# Patient Record
Sex: MALE | Race: WHITE | NOT HISPANIC OR LATINO | Employment: UNEMPLOYED | URBAN - METROPOLITAN AREA
[De-identification: names, ages, dates, MRNs, and addresses within clinical notes are randomized per-mention and may not be internally consistent; named-entity substitution may affect disease eponyms.]

---

## 2019-12-06 ENCOUNTER — APPOINTMENT (EMERGENCY)
Dept: RADIOLOGY | Facility: HOSPITAL | Age: 34
End: 2019-12-06
Payer: COMMERCIAL

## 2019-12-06 ENCOUNTER — HOSPITAL ENCOUNTER (EMERGENCY)
Facility: HOSPITAL | Age: 34
Discharge: HOME | End: 2019-12-06
Attending: EMERGENCY MEDICINE
Payer: COMMERCIAL

## 2019-12-06 ENCOUNTER — TELEPHONE (OUTPATIENT)
Dept: UROLOGY | Facility: MEDICAL CENTER | Age: 34
End: 2019-12-06

## 2019-12-06 VITALS
HEART RATE: 80 BPM | TEMPERATURE: 96.2 F | DIASTOLIC BLOOD PRESSURE: 83 MMHG | SYSTOLIC BLOOD PRESSURE: 144 MMHG | OXYGEN SATURATION: 98 % | BODY MASS INDEX: 34.53 KG/M2 | WEIGHT: 220 LBS | HEIGHT: 67 IN | RESPIRATION RATE: 18 BRPM

## 2019-12-06 DIAGNOSIS — R31.9 HEMATURIA: ICD-10-CM

## 2019-12-06 DIAGNOSIS — N23 RENAL COLIC ON LEFT SIDE: Primary | ICD-10-CM

## 2019-12-06 DIAGNOSIS — N20.0 NEPHROLITHIASIS: ICD-10-CM

## 2019-12-06 DIAGNOSIS — R93.819 ABNORMAL FINDING ON DIAGNOSTIC IMAGING OF TESTICLE: ICD-10-CM

## 2019-12-06 LAB
BACTERIA UR QL AUTO: ABNORMAL /HPF
BILIRUB UR QL STRIP: NEGATIVE
CLARITY UR: CLEAR
COLOR UR: ABNORMAL
COLOR, POC: NORMAL
GLUCOSE UR STRIP-MCNC: NEGATIVE MG/DL
HGB UR QL STRIP.AUTO: ABNORMAL
HYALINE CASTS #/AREA URNS LPF: ABNORMAL /LPF
KETONES UR STRIP-MCNC: NEGATIVE MG/DL
LEUKOCYTE ESTERASE UR QL STRIP: NEGATIVE
NITRITE UR QL STRIP: NEGATIVE
NON-SQ EPI CELLS URNS QL MICRO: ABNORMAL /HPF
PH UR STRIP.AUTO: 7 [PH] (ref 4.5–8)
PROT UR STRIP-MCNC: ABNORMAL MG/DL
RBC #/AREA URNS AUTO: ABNORMAL /HPF
SP GR UR STRIP.AUTO: 1.01 (ref 1–1.03)
UROBILINOGEN UR QL STRIP.AUTO: 0.2 E.U./DL
WBC #/AREA URNS AUTO: ABNORMAL /HPF

## 2019-12-06 PROCEDURE — 87591 N.GONORRHOEAE DNA AMP PROB: CPT | Performed by: PHYSICIAN ASSISTANT

## 2019-12-06 PROCEDURE — 99285 EMERGENCY DEPT VISIT HI MDM: CPT | Performed by: PHYSICIAN ASSISTANT

## 2019-12-06 PROCEDURE — 87491 CHLMYD TRACH DNA AMP PROBE: CPT | Performed by: PHYSICIAN ASSISTANT

## 2019-12-06 PROCEDURE — 74176 CT ABD & PELVIS W/O CONTRAST: CPT

## 2019-12-06 PROCEDURE — 76870 US EXAM SCROTUM: CPT

## 2019-12-06 PROCEDURE — 81001 URINALYSIS AUTO W/SCOPE: CPT

## 2019-12-06 PROCEDURE — 99284 EMERGENCY DEPT VISIT MOD MDM: CPT

## 2019-12-06 RX ORDER — OXYCODONE HYDROCHLORIDE AND ACETAMINOPHEN 5; 325 MG/1; MG/1
1 TABLET ORAL EVERY 4 HOURS PRN
Qty: 10 TABLET | Refills: 0 | Status: SHIPPED | OUTPATIENT
Start: 2019-12-06 | End: 2019-12-09

## 2019-12-06 RX ORDER — ONDANSETRON 4 MG/1
4 TABLET, ORALLY DISINTEGRATING ORAL EVERY 8 HOURS PRN
Qty: 20 TABLET | Refills: 0 | Status: SHIPPED | OUTPATIENT
Start: 2019-12-06 | End: 2020-02-06

## 2019-12-06 RX ORDER — TAMSULOSIN HYDROCHLORIDE 0.4 MG/1
0.4 CAPSULE ORAL DAILY
Qty: 10 CAPSULE | Refills: 0 | Status: SHIPPED | OUTPATIENT
Start: 2019-12-06 | End: 2020-02-06

## 2019-12-06 RX ORDER — NAPROXEN 500 MG/1
500 TABLET ORAL EVERY 12 HOURS PRN
Qty: 10 TABLET | Refills: 0 | Status: SHIPPED | OUTPATIENT
Start: 2019-12-06 | End: 2020-02-06

## 2019-12-06 NOTE — TELEPHONE ENCOUNTER
This is a new patient and was seen in [facility] for kidney stone   Please call patient at [phone]  Patient is calling his insurance to make sure he is in par with us

## 2019-12-06 NOTE — TELEPHONE ENCOUNTER
Spoke with patient  Patient scheduled for 12/10/19 at 2:45 in the [facility] office with Ryan Michelle  Office address and location provided to patient

## 2019-12-06 NOTE — DISCHARGE INSTRUCTIONS
Kidney Stones   WHAT YOU NEED TO KNOW:   Kidney stones form in the urinary system when the water and waste in your urine are out of balance  When this happens, certain types of waste crystals separate from the urine  The crystals build up and form kidney stones  You may have 1 or more kidney stones  DISCHARGE INSTRUCTIONS:   Return to the emergency department if:   · You have vomiting that is not relieved by medicine  Contact your healthcare provider if:   · You have a fever  · You have trouble passing urine  · You see blood in your urine  · You have severe pain  · You have any questions or concerns about your condition or care  Medicines:   · NSAIDs , such as ibuprofen, help decrease swelling, pain, and fever  This medicine is available with or without a doctor's order  NSAIDs can cause stomach bleeding or kidney problems in certain people  If you take blood thinner medicine, always ask your healthcare provider if NSAIDs are safe for you  Always read the medicine label and follow directions  · Prescription medicine  may be given  Ask how to take this medicine safely  · Medicines  to balance your electrolytes may be needed  · Take your medicine as directed  Contact your healthcare provider if you think your medicine is not helping or if you have side effects  Tell him or her if you are allergic to any medicine  Keep a list of the medicines, vitamins, and herbs you take  Include the amounts, and when and why you take them  Bring the list or the pill bottles to follow-up visits  Carry your medicine list with you in case of an emergency  Follow up with your healthcare provider as directed: You may need to return for more tests  Write down your questions so you remember to ask them during your visits  Self-care:   · Drink plenty of liquids  Your healthcare provider may tell you to drink at least 8 to 12 (eight-ounce) cups of liquids each day   This helps flush out the kidney stones when you urinate  Water is the best liquid to drink  · Strain your urine every time you go to the bathroom  Urinate through a strainer or a piece of thin cloth to catch the stones  Take the stones to your healthcare provider so they can be sent to the lab for tests  This will help your healthcare providers plan the best treatment for you  · Eat a variety of healthy foods  Healthy foods include fruits, vegetables, whole-grain breads, low-fat dairy products, beans, and fish  You may need to limit how much sodium (salt) or protein you eat  Ask for information about the best foods for you  · Stay active  Your stones may pass more easily by if you stay active  Ask about the best activities for you  After you pass your kidney stones:  Once you have passed your kidney stones, your healthcare provider may  order a 24-hour urine test  Results from a 24-hour urine test will help your healthcare provider plan ways to prevent more stones from forming  If you are told to do a 24-hour test, your healthcare provider will give you more instructions  © 2017 [address] [facility] Information is for End User's use only and may not be sold, redistributed or otherwise used for commercial purposes  All illustrations and images included in CareNotes® are the copyrighted property of A D A M [employer]  or Mike Lim  The above information is an  only  It is not intended as medical advice for individual conditions or treatments  Talk to your doctor, nurse or pharmacist before following any medical regimen to see if it is safe and effective for you  How to Strain Your Urine   WHAT YOU NEED TO KNOW:   Urinate into a strainer (funnel with a fine mesh on the bottom) or glass jar to collect kidney stones  DISCHARGE INSTRUCTIONS:   Medicines:   · Pain medicine: You may be given medicine to take away or decrease pain   Do not wait until the pain is severe before you take your medicine  · NSAIDs:  These medicines decrease swelling, pain, and fever  NSAIDs are available without a doctor's order  Ask which medicine is right for you  Ask how much to take and when to take it  Take as directed  NSAIDs can cause stomach bleeding and kidney problems if not taken correctly  · Nausea medicine: This medicine calms your stomach and prevents or controls vomiting  · Take your medicine as directed  Contact your healthcare provider if you think your medicine is not helping or if you have side effects  Tell him of her if you are allergic to any medicine  Keep a list of the medicines, vitamins, and herbs you take  Include the amounts, and when and why you take them  Bring the list or the pill bottles to follow-up visits  Carry your medicine list with you in case of an emergency  Drink liquids as directed:  Drink about 3 liters of liquids each day, or as directed  That equals about 12 glasses of water or fruit juice  Half of your total daily liquids should be water  Limit coffee, tea, and soda to 2 cups daily  Your urine will be pale and clear if you are drinking enough liquid  Self-care:   · Activity:  Exercise, such as walking, may help decrease your pain  · Avoid heat:  Heat may cause you to sweat, urinate less, and become dehydrated  Follow up with your healthcare provider or urologist as directed:  Write down your questions so you remember to ask them during your visits  Contact your healthcare provider or urologist if:   · You have a fever and chills  · Your urine looks cloudy or has a bad smell  · You have burning pain when you urinate  · You have trouble urinating  · You are vomiting and it does not get better, even after you take medicine  · You have questions or concerns about your condition or care  Return to the emergency department if:   · You are not able to urinate  · You have severe pain in your lower abdomen or side      · Your heart flutters or beats faster than usual   © 2017 [address] [facility] Information is for End User's use only and may not be sold, redistributed or otherwise used for commercial purposes  All illustrations and images included in CareNotes® are the copyrighted property of A D A M , Inc  or Mike Lim  The above information is an  only  It is not intended as medical advice for individual conditions or treatments  Talk to your doctor, nurse or pharmacist before following any medical regimen to see if it is safe and effective for you

## 2019-12-06 NOTE — TELEPHONE ENCOUNTER
Complaint/diagnosis:  Large kidney stone    Insurance  [facility] plus    History of Cancer no    Previous Urologist  No     Outside testing/where  no    what kind of test  No     Records requested/where  epic    Preferred Location [facility]

## 2019-12-06 NOTE — ED PROVIDER NOTES
History  Chief Complaint   Patient presents with    Blood in Urine     Pt c/o blood in urine this am   Pt states has had a kidney stone in past but does not feel the same     Ardith Done is a 29 y o  Male with a PMHx of Nephrolithiasis who presents to the ED with complaints of hematuria and left groin pain over the past 3 hours  Patient states yesterday he was at work when he accidentally sat on his left testicle  Patient states he has been having left testicular pressure  Patient reports mild nausea  Patient reports he did have a self diagnosis kidney stone approximately 2 years ago which he passed on his own  Denies recent unprotected sex  Denies vomiting, testicular swelling, penile discharge, penile pain, dysuria, urinary frequency, urinary urgency, chest pain, shortness of breath, fever, chills  History provided by:  Patient  Blood in Urine   This is a new problem  The current episode started today  He describes the hematuria as gross hematuria  The hematuria occurs throughout his entire urinary stream  He reports no clotting in his urine stream  Irritative symptoms do not include frequency or urgency  Associated symptoms include nausea  Pertinent negatives include no abdominal pain, chills, dysuria, fever, flank pain or vomiting  His past medical history is significant for kidney stones  None       Past Medical History:   Diagnosis Date    Kidney stone        Past Surgical History:   Procedure Laterality Date    CYST REMOVAL      foot       History reviewed  No pertinent family history  I have reviewed and agree with the history as documented  Social History     Tobacco Use    Smoking status: Current Some Day Smoker     Types: Cigars    Smokeless tobacco: Never Used   Substance Use Topics    Alcohol use: Not Currently     Frequency: Never    Drug use: Never        Review of Systems   Constitutional: Negative for appetite change, chills, fever and unexpected weight change     HENT: Negative for congestion, drooling, ear pain, rhinorrhea, sore throat, trouble swallowing and voice change  Eyes: Negative for pain, discharge, redness and visual disturbance  Respiratory: Negative for cough, shortness of breath, wheezing and stridor  Cardiovascular: Negative for chest pain, palpitations and leg swelling  Gastrointestinal: Positive for nausea  Negative for abdominal pain, blood in stool, constipation, diarrhea and vomiting  Genitourinary: Positive for hematuria and testicular pain  Negative for decreased urine volume, difficulty urinating, discharge, dysuria, enuresis, flank pain, frequency, genital sores, penile pain, penile swelling, scrotal swelling and urgency  Musculoskeletal: Negative for gait problem, joint swelling, neck pain and neck stiffness  Skin: Negative for color change and rash  Neurological: Negative for dizziness, seizures, light-headedness and headaches  Physical Exam  Physical Exam   Constitutional: He is oriented to person, place, and time  He appears well-developed and well-nourished  HENT:   Head: Normocephalic and atraumatic  Nose: Nose normal    Mouth/Throat: Oropharynx is clear and moist    Eyes: Pupils are equal, round, and reactive to light  Conjunctivae and EOM are normal    Neck: Normal range of motion  Neck supple  Cardiovascular: Normal rate, regular rhythm and intact distal pulses  Pulmonary/Chest: Effort normal and breath sounds normal    Abdominal: Normal appearance and bowel sounds are normal  There is no tenderness  Genitourinary: Cremasteric reflex is present  Left testis shows tenderness  Circumcised  Genitourinary Comments: RN present for  examination  TTP of the left posterior testicle  No appreciable swelling or erythema  Musculoskeletal: Normal range of motion  Neurological: He is alert and oriented to person, place, and time  Skin: Skin is warm and dry  Capillary refill takes less than 2 seconds     Nursing note and vitals reviewed  Vital Signs  ED Triage Vitals [12/06/19 0933]   Temperature Pulse Respirations Blood Pressure SpO2   (!) 96 2 °F (35 7 °C) 80 18 144/83 98 %      Temp Source Heart Rate Source Patient Position - Orthostatic VS BP Location FiO2 (%)   Tympanic Monitor Sitting Left arm --      Pain Score       2           Vitals:    12/06/19 0933   BP: 144/83   Pulse: 80   Patient Position - Orthostatic VS: Sitting         Visual Acuity      ED Medications  Medications - No data to display    Diagnostic Studies  Results Reviewed     Procedure Component Value Units Date/Time    Urine Microscopic [[id number]]  (Abnormal) Collected:  12/06/19 1111    Lab Status:  Final result Specimen:  Urine, Other Updated:  12/06/19 1137     RBC, UA Innumerable /hpf      WBC, UA None Seen /hpf      Epithelial Cells None Seen /hpf      Bacteria, UA None Seen /hpf      Hyaline Casts, UA None Seen /lpf     Chlamydia/GC amplified DNA by PCR [[id number]] Collected:  12/06/19 1117    Lab Status: In process Specimen:  Urine, Other Updated:  12/06/19 1120    POCT urinalysis dipstick [[id number]]  (Normal) Resulted:  12/06/19 1116    Lab Status:  Final result Specimen:  Urine Updated:  12/06/19 1116     Color, UA BLOODY    Urine Macroscopic, POC [[id number]]  (Abnormal) Collected:  12/06/19 1111    Lab Status:  Final result Specimen:  Urine Updated:  12/06/19 1111     Color, UA Bloody     Clarity, UA Clear     pH, UA 7 0     Leukocytes, UA Negative     Nitrite, UA Negative     Protein, UA Trace mg/dl      Glucose, UA Negative mg/dl      Ketones, UA Negative mg/dl      Urobilinogen, UA 0 2 E U /dl      Bilirubin, UA Negative     Blood, UA Large     Specific [place], UA 1 015    Narrative:       CLINITEK RESULT                 CT renal stone study abdomen pelvis without contrast   Final Result by Rao Cruz MD (12/06 1127)      1   Mild left-sided hydronephrosis, secondary to a 4 mm stone in the region of the left UPJ    2  Slight distention of the more distal left ureter with periureteral edema but without evidence of a distal ureteral stone  Appearance suggests recent passage of a calculus  Intraprostatic calcification noted, likely glandular or less likely within    the prostatic urethra   3  Additional small punctate bilateral intrarenal calculi   4  Hepatic steatosis         Workstation performed: [id number]         US scrotum and testicles   Final Result by Natalee King, DO (12/06 1055)       Solitary echogenic focus in the right testicle likely calcification measuring 3 mm  This may be the sequela of remote prior infection or trauma  Otherwise unremarkable scrotal ultrasound  Workstation performed: [id number]                    Procedures  Procedures         ED Course  ED Course as of Dec 06 1250   Fri Dec 06, 2019   1140 Educated patient regarding diagnosis and management  Advised patient to follow up with PCP  Advised patient to RTER for persistent or worsening symptoms  MDM  Number of Diagnoses or Management Options  Abnormal finding on diagnostic imaging of testicle: new and does not require workup  Hematuria: new and does not require workup  Nephrolithiasis: new and does not require workup  Renal colic on left side: new and does not require workup  Diagnosis management comments: Drew Rodrigues is a 29 y o  Male with a PMHx of Nephrolithiasis who presents to the ED with complaints of hematuria and left groin pain over the past 3 hours  Patient states yesterday he was at work when he accidentally sat on his left testicle  Patient states he has been having left testicular pressure  Patient reports mild nausea  Patient reports he did have a self diagnosis kidney stone approximately 2 years ago which he passed on his own  Denies recent unprotected sex    Denies vomiting, testicular swelling, penile discharge, penile pain, dysuria, urinary frequency, urinary urgency, chest pain, shortness of breath, fever, chills  UA with large blood  US of scrotum and testicles significant for solitary echogenic focus in the right testicle likely calcification measuring 3 mm  This may be the sequela of remote prior infection or trauma  CT Renal Stones study significant for 1  Mild left-sided hydronephrosis, secondary to a 4 mm stone in the region of the left UPJ  2  Slight distention of the more distal left ureter with periureteral edema but without evidence of a distal ureteral stone  Appearance suggests recent passage of a calculus  Intraprostatic calcification noted, likely glandular or less likely within the prostatic urethra  3  Additional small punctate bilateral intrarenal calculi  4  Hepatic steatosis  I had an extensive conversation with the patient in regards the the findings of the imaging study including incidental findings as portrayed to me by the radiologist's report  Pain is under control and patient is making urine without difficulty  Patient was advised to strain urine  Patient was advised to follow up with outpatient urology  I provided patient with strict RTER precautions  I advised patient follow-up with PCP in 24-48 hours  Patient verbalized understanding          Amount and/or Complexity of Data Reviewed  Clinical lab tests: reviewed and ordered  Tests in the radiology section of CPT®: ordered and reviewed  Review and summarize past medical records: yes    Risk of Complications, Morbidity, and/or Mortality  Presenting problems: moderate  Diagnostic procedures: moderate  Management options: moderate    Patient Progress  Patient progress: improved        Disposition  Final diagnoses:   Renal colic on left side   Nephrolithiasis   Abnormal finding on diagnostic imaging of testicle - Right Testicle Calcification   Hematuria     Time reflects when diagnosis was documented in both MDM as applicable and the Disposition within this note     Time User Action Codes Description Comment 12/6/2019 11:42 AM Radha Fields Add [C97] Renal colic on left side     12/6/2019 11:43 AM Radha Fields Add [N20 0] Nephrolithiasis     12/6/2019 11:43 AM Radha Fields Add [N44 2] Testicular cyst     12/6/2019 11:43 AM Radha Fiedls Remove [N44 2] Testicular cyst     12/6/2019 11:44 AM Radha Fields Add [R93 819] Abnormal finding on diagnostic imaging of testicle     12/6/2019 11:44 AM Radha Fields Modify [R93 819] Abnormal finding on diagnostic imaging of testicle Right Testicle Calcification    12/6/2019 11:46 AM Radha Fields Add [R31 9] Hematuria       ED Disposition     ED Disposition Condition Date/Time Comment    Discharge Stable Fri Dec 6, 2019 11:45 AM Usman Zarate discharge to home/self care              Follow-up Information     Follow up With Specialties Details Why Contact Info Additional [address] [facility] Emergency Medicine Go to  If symptoms worsen [address]  [phone]  ED, [address] [address]    Wally Whitney MD Internal Medicine, Geriatric Medicine Schedule an appointment as soon as possible for a visit   [address] [facility] [address]   [address]        [facility] Schedule an appointment as soon as possible for a visit   [address] [facility] [address] [facility] [id number]  [address] [place] [address] For U[facility], [address] [place] [address] [address], [place], [place], [address] [facility] [address]          Discharge Medication List as of 12/6/2019 11:46 AM      START taking these medications    Details   naproxen (NAPROSYN) 500 mg tablet Take 1 tablet (500 mg total) by mouth every 12 (twelve) hours as needed for mild pain for up to 5 days, Starting Fri 12/6/2019, Until Wed 12/11/2019, Print      ondansetron (ZOFRAN-ODT) 4 mg disintegrating tablet Take 1 tablet (4 mg total) by mouth every 8 (eight) hours as needed for nausea or vomiting, Starting Fri 12/6/2019, Print      oxyCODONE-acetaminophen (PERCOCET) 5-325 mg per tablet Take 1 tablet by mouth every 4 (four) hours as needed for moderate pain or severe pain for up to 3 daysMax Daily Amount: 6 tablets, Starting Fri 12/6/2019, Until Mon 12/9/2019, Print      tamsulosin (FLOMAX) 0 4 mg Take 1 capsule (0 4 mg total) by mouth daily Take once daily until stone passes, Starting Fri 12/6/2019, Print           No discharge procedures on file      ED Provider  Electronically Signed by           Jayda Jon PA-C  12/06/19 1217

## 2019-12-07 LAB
C TRACH DNA SPEC QL NAA+PROBE: NEGATIVE
N GONORRHOEA DNA SPEC QL NAA+PROBE: NEGATIVE

## 2019-12-23 ENCOUNTER — OFFICE VISIT (OUTPATIENT)
Dept: UROLOGY | Facility: CLINIC | Age: 34
End: 2019-12-23
Payer: COMMERCIAL

## 2019-12-23 VITALS
SYSTOLIC BLOOD PRESSURE: 124 MMHG | WEIGHT: 214 LBS | HEART RATE: 76 BPM | DIASTOLIC BLOOD PRESSURE: 68 MMHG | BODY MASS INDEX: 33.59 KG/M2 | HEIGHT: 67 IN

## 2019-12-23 DIAGNOSIS — Z30.09 STERILIZATION CONSULT: ICD-10-CM

## 2019-12-23 DIAGNOSIS — N20.0 CALCULUS OF KIDNEY: Primary | ICD-10-CM

## 2019-12-23 DIAGNOSIS — N50.89 TESTICULAR CALCIFICATION: ICD-10-CM

## 2019-12-23 PROCEDURE — 99202 OFFICE O/P NEW SF 15 MIN: CPT | Performed by: UROLOGY

## 2019-12-23 NOTE — PROGRESS NOTES
Progress Note - Urology  Nela Mesa 29 y o  male MRN: [id number]  Encounter: [id number]      Chief Complaint:   Chief Complaint   Patient presents with    Nephrolithiasis       HPI:   49-year-old male presents for follow-up evaluation of his 2nd renal stone which passed spontaneously  He had a stone approximately 4 years ago which passed spontaneously he recently had discomfort in the left flank and present to the emergency room with CT scan did reveal a 4 mm stone in the proximal UPJ junction  The stone passed rather promptly  He has no further pain no hematuria  It did present with hematuria  In addition he had an episode of testicular discomfort for which he had an ultrasound done  He has no fever chills no history of UTI  There is no history of scrotal trauma or scrotal surgery  The ultrasound apparently revealed a calcification within the body of the right testis  The CT scan showed approximately 1 to 2 small stones in the right kidney less than 1 mm    In addition he has 2 children  He wishes to discuss elective sterilization  We did review vasectomy at length  We reviewed the procedure and performing the procedure in the office discuss this situation  We also discussed potential complications these include but not limited to infection, scrotal hematoma, ecchymosis  Chronic testicular pain  Increased risk of epididymitis  Failure to achieve sterilization  We discussed the fact that after the surgical intervention he would not have any change in sexual function or appearance  Advised that he is not sterile immediately and would need to have 2- semen analysis over the course of the 3-4 month period time post vasectomy        MEDS:    Current Outpatient Medications:     hydrocortisone 2 5 % cream, , Disp: , Rfl:     ondansetron (ZOFRAN-ODT) 4 mg disintegrating tablet, Take 1 tablet (4 mg total) by mouth every 8 (eight) hours as needed for nausea or vomiting, Disp: 20 tablet, Rfl: 0   tamsulosin (FLOMAX) 0 4 mg, Take 1 capsule (0 4 mg total) by mouth daily Take once daily until stone passes, Disp: 10 capsule, Rfl: 0    naproxen (NAPROSYN) 500 mg tablet, Take 1 tablet (500 mg total) by mouth every 12 (twelve) hours as needed for mild pain for up to 5 days, Disp: 10 tablet, Rfl: 0      PMH:  Past Medical History:   Diagnosis Date    Kidney stone          PSH  Past Surgical History:   Procedure Laterality Date    CYST REMOVAL      foot    EXCISION / CURETTAGE BONE CYST TALUS / CALCANEUS Left          FH  Family History   Problem Relation Age of Onset    Nephrolithiasis Father     Nephrolithiasis Mother         SH  Social History     Socioeconomic History    Marital status: /Civil Union     Spouse name: None    Number of children: None    Years of education: None    Highest education level: None   Occupational History    None   Social Needs    Financial resource strain: None    Food insecurity:     Worry: None     Inability: None    Transportation needs:     Medical: None     Non-medical: None   Tobacco Use    Smoking status: Never Smoker    Smokeless tobacco: Never Used   Substance and Sexual Activity    Alcohol use: Yes     Frequency: Never    Drug use: Never    Sexual activity: None   Lifestyle    Physical activity:     Days per week: None     Minutes per session: None    Stress: None   Relationships    Social connections:     Talks on phone: None     Gets together: None     Attends Cheondoism service: None     Active member of club or organization: None     Attends meetings of clubs or organizations: None     Relationship status: None    Intimate partner violence:     Fear of current or ex partner: None     Emotionally abused: None     Physically abused: None     Forced sexual activity: None   Other Topics Concern    None   Social History Narrative    None          ROS:  Review of Systems   Constitutional: Negative  Respiratory: Negative      Cardiovascular: Negative  Neurological: Negative  Psychiatric/Behavioral: Negative  Vitals:  Blood pressure 124/68, pulse 76, height 5' 7" (1 702 m), weight 97 1 kg (214 lb)  Physical Exam:       General Appearance    51-year-old male in no acute distress  Moderately overweight  Cardiac   Heart rate is regular  Pulmonary   Breathing is not labored  Abdomen   Soft, no mass appreciated no tenderness noted  Back   No CVA tenderness  Genitalia   Normal penis  Meatus is normal no discharge scrotum is unremarkable both testes are palpated without incident  No scrotal masses noted, no  testicular tenderness or masses noted  The epididymis and vas deferens palpated bilaterally are unremarkable  Rectal     Deferred  Extremities    No cyanosis or edema  Neurologic   Grossly physiologic  Moving all extremities  Hearing intact cognitive function intact  No facial asymmetry    Normal gait    Lab, Imaging and other studies:  Recent Results (from the past 672 hour(s))   Urine Macroscopic, POC    Collection Time: 12/06/19 11:11 AM   Result Value Ref Range    Color, UA Bloody     Clarity, UA Clear     pH, UA 7 0 4 5 - 8 0    Leukocytes, UA Negative Negative    Nitrite, UA Negative Negative    Protein, UA Trace (A) Negative mg/dl    Glucose, UA Negative Negative mg/dl    Ketones, UA Negative Negative mg/dl    Urobilinogen, UA 0 2 0 2, 1 0 E U /dl E U /dl    Bilirubin, UA Negative Negative    Blood, UA Large (A) Negative    Specific Gravity, UA 1 015 1 003 - 1 030   Urine Microscopic    Collection Time: 12/06/19 11:11 AM   Result Value Ref Range    RBC, UA Innumerable (A) None Seen, 0-5 /hpf    WBC, UA None Seen None Seen, 0-5, 5-55, 5-65 /hpf    Epithelial Cells None Seen None Seen, Occasional /hpf    Bacteria, UA None Seen None Seen, Occasional /hpf    Hyaline Casts, UA None Seen None Seen /lpf   POCT urinalysis dipstick    Collection Time: 12/06/19 11:16 AM   Result Value Ref Range    Color, UA BLOODY    Chlamydia/GC amplified DNA by PCR    Collection Time: 12/06/19 11:17 AM   Result Value Ref Range    N gonorrhoeae, DNA Probe Negative Negative    Chlamydia trachomatis, DNA Probe Negative Negative           IMPRESSION:   1  Left renal calculus probable spontaneous passage   2  Discussion elective sterilization   3  History of solitary testicular calcification    PLAN:   I will check a urinalysis that to be certain there is no further bleeding  If positive then ultrasound will be indicated  We discussed elective sterilization he will take this under consideration is given literature regarding stone disease as well as sterilization  He will contact us if he wished to proceed in this direction  Suggested in of a scrotal ultrasound in 1 year  Self examination is important and he will call if there should be a mass or lump in the testis  Please note :  Voice dictation software has been used to create this document  There may be inadvertent transcription errors

## 2019-12-23 NOTE — LETTER
December 23, 2019     MD [address] , Brian Rubio  [address]    Patient: Isac Burciaga   YOB: 1985   Date of Visit: 12/23/2019       Dear Dr Darlene Dixon: Thank you for referring Josiah Tijerina to me for evaluation  Below are my notes for this consultation  If you have questions, please do not hesitate to call me  I look forward to following your patient along with you           Sincerely,        Claire Ballesteros MD        CC: No Recipients

## 2020-01-02 ENCOUNTER — TELEPHONE (OUTPATIENT)
Dept: UROLOGY | Facility: MEDICAL CENTER | Age: 35
End: 2020-01-02

## 2020-01-02 ENCOUNTER — APPOINTMENT (OUTPATIENT)
Dept: LAB | Facility: CLINIC | Age: 35
End: 2020-01-02
Payer: COMMERCIAL

## 2020-01-02 DIAGNOSIS — N20.0 CALCULUS OF KIDNEY: ICD-10-CM

## 2020-01-02 DIAGNOSIS — N20.1 LEFT URETERAL CALCULUS: Primary | ICD-10-CM

## 2020-01-02 LAB
BACTERIA UR QL AUTO: ABNORMAL /HPF
BILIRUB UR QL STRIP: NEGATIVE
CLARITY UR: CLEAR
COLOR UR: YELLOW
GLUCOSE UR STRIP-MCNC: NEGATIVE MG/DL
HGB UR QL STRIP.AUTO: ABNORMAL
HYALINE CASTS #/AREA URNS LPF: ABNORMAL /LPF
KETONES UR STRIP-MCNC: NEGATIVE MG/DL
LEUKOCYTE ESTERASE UR QL STRIP: NEGATIVE
NITRITE UR QL STRIP: NEGATIVE
NON-SQ EPI CELLS URNS QL MICRO: ABNORMAL /HPF
PH UR STRIP.AUTO: 6.5 [PH]
PROT UR STRIP-MCNC: NEGATIVE MG/DL
RBC #/AREA URNS AUTO: ABNORMAL /HPF
SP GR UR STRIP.AUTO: 1.02 (ref 1–1.03)
UROBILINOGEN UR QL STRIP.AUTO: 0.2 E.U./DL
WBC #/AREA URNS AUTO: ABNORMAL /HPF

## 2020-01-02 PROCEDURE — 81001 URINALYSIS AUTO W/SCOPE: CPT

## 2020-01-02 RX ORDER — HYDROCODONE BITARTRATE AND ACETAMINOPHEN 5; 325 MG/1; MG/1
1 TABLET ORAL EVERY 4 HOURS PRN
Qty: 10 TABLET | Refills: 0 | Status: SHIPPED | OUTPATIENT
Start: 2020-01-02 | End: 2020-01-04

## 2020-01-02 NOTE — TELEPHONE ENCOUNTER
Patient of [address] [place] [address] office  Patient seen on 12/23 by Dr Radha Sampson  Patient had spontaneously passed stone by that time  Patient denied any symptoms at time of visit  Patient ordered for UA, which he is going to do today  Per OV note, Dr Radha Sampson states if UA is positive, US would be indicated  Call placed to patient, he states that last night he woke up from sleep due to left sided pain (9 out of 10)  Patient took narcotics prescribed from ER visit and pain was relived  Patient would like to know if he should proceed with any further testing  Advised will route to provider and someone from office will call back

## 2020-01-02 NOTE — TELEPHONE ENCOUNTER
Patient calls has renewed left flank pain  Prior CT showed a proximal 4 millimeter calculus  He is currently pain level 4-5  It is still in the flank and not radiating to the testicle at this time  I advised him to go to the emergency room should there be intractable pain, nausea, vomiting, fever, or chills  He should strain the urine and push fluids as much as possible  A prescription sent for 10 tablets of Vicodin 5/325   Patient to call in 24 hours unless he goes to the emergency room

## 2020-01-02 NOTE — TELEPHONE ENCOUNTER
Patient of Dr Castillo Grimm  Patient believes he has another stone  Patient is having a lot of discomfort and pain  Patient is having pain when he urinates  Patient is going to get u/a now but would like to know what else he should do for pain  Patient states his pain level now is about 6  Please advise

## 2020-01-06 NOTE — TELEPHONE ENCOUNTER
Patient left message with answering service yesterday as follows:    Message # [id number]         2020 04:53p   [JI]  TO:  [facility]  CALLER:  Dot Pattersonrob  CALLER TELE #:  [phone] Ext    HOSP NAME:  ----------------------------------------------------------------------  Dr: Rani Meza  Pt Name: Dot Stevens  :[birth year]  Emerg?: N  Msg: PATIENT STATED HE'S JUST FOLLOWING UP WITH THE OFFICE TO ADVISED "HE STILL HAVE BLOOD IN HIS URINE AND HAVING SOME PAIN  ALSO PLEASE RETURN CALL

## 2020-01-06 NOTE — TELEPHONE ENCOUNTER
Update on patient: he is doing ok with slight groin pain of a level #3 pain  He is still straining urine but seemed to be only on granules  He denies any fevers or chills, no penile pain or discomfort, just slight groin pain  He has some Vicodin remaining, but isn't crazy about taking it unless really necessary  He leaves via flight for a conference on Friday, what do you suggest for him to do going forward?

## 2020-01-07 ENCOUNTER — HOSPITAL ENCOUNTER (OUTPATIENT)
Dept: RADIOLOGY | Facility: HOSPITAL | Age: 35
Discharge: HOME | End: 2020-01-07
Attending: UROLOGY
Payer: COMMERCIAL

## 2020-01-07 ENCOUNTER — TRANSCRIBE ORDERS (OUTPATIENT)
Dept: RADIOLOGY | Facility: HOSPITAL | Age: 35
End: 2020-01-07

## 2020-01-07 DIAGNOSIS — N20.1 LEFT URETERAL CALCULUS: ICD-10-CM

## 2020-01-07 PROCEDURE — 74018 RADEX ABDOMEN 1 VIEW: CPT

## 2020-01-07 PROCEDURE — 76770 US EXAM ABDO BACK WALL COMP: CPT

## 2020-01-08 NOTE — TELEPHONE ENCOUNTER
Patient called for results    Also wished to know if it was safe to fly with a kidney stone  Please call to discuss at [phone]  Thank you

## 2020-01-13 ENCOUNTER — TELEPHONE (OUTPATIENT)
Dept: UROLOGY | Facility: CLINIC | Age: 35
End: 2020-01-13

## 2020-01-13 DIAGNOSIS — N20.1 LEFT URETERAL CALCULUS: Primary | ICD-10-CM

## 2020-01-13 NOTE — TELEPHONE ENCOUNTER
I spoke to the patient regarding his KUB which showed a probable residual 3 mm proximal left ureteral calculus  He is in no pain has no distress has had no bleeding    I recommended that we do a follow-up ultrasound and urinalysis on his office visit in early February

## 2020-01-26 ENCOUNTER — TELEPHONE (OUTPATIENT)
Dept: OTHER | Facility: OTHER | Age: 35
End: 2020-01-26

## 2020-01-27 NOTE — TELEPHONE ENCOUNTER
Patient is still having pain on an off from stone, does not believe it passed  Patient made sooner apt for tomorrow

## 2020-01-28 ENCOUNTER — OFFICE VISIT (OUTPATIENT)
Dept: UROLOGY | Facility: CLINIC | Age: 35
End: 2020-01-28
Payer: COMMERCIAL

## 2020-01-28 ENCOUNTER — TELEPHONE (OUTPATIENT)
Dept: UROLOGY | Facility: CLINIC | Age: 35
End: 2020-01-28

## 2020-01-28 VITALS
BODY MASS INDEX: 33.59 KG/M2 | WEIGHT: 214 LBS | DIASTOLIC BLOOD PRESSURE: 66 MMHG | SYSTOLIC BLOOD PRESSURE: 112 MMHG | HEIGHT: 67 IN | HEART RATE: 60 BPM

## 2020-01-28 DIAGNOSIS — N20.1 LEFT URETERAL CALCULUS: Primary | ICD-10-CM

## 2020-01-28 PROCEDURE — 99213 OFFICE O/P EST LOW 20 MIN: CPT | Performed by: UROLOGY

## 2020-01-28 RX ORDER — TAMSULOSIN HYDROCHLORIDE 0.4 MG/1
0.4 CAPSULE ORAL
Qty: 7 CAPSULE | Refills: 0 | Status: SHIPPED | OUTPATIENT
Start: 2020-01-28 | End: 2020-02-06

## 2020-01-28 NOTE — PROGRESS NOTES
Progress Note - Urology  Iggy Sanford 29 y o  male MRN: [id number]  Encounter: [id number]      Chief Complaint:   Chief Complaint   Patient presents with    Nephrolithiasis       HPI:   66-year-old male initially had onset of flank pain on the left side December 6  Subsequently found to have a proximal 3-4 mm stone left ureter  No significant hydronephrosis  He has had no significant pain but now feels pressure and abnormal sensation in the left lower quadrant  He has no UTI symptoms  He has no fever chills  He has no intractable pain  He did not pass the stone to his knowledge    He does not have the sensation of pressure or frequency of voiding      MEDS:    Current Outpatient Medications:     hydrocortisone 2 5 % cream, , Disp: , Rfl:     naproxen (NAPROSYN) 500 mg tablet, Take 1 tablet (500 mg total) by mouth every 12 (twelve) hours as needed for mild pain for up to 5 days, Disp: 10 tablet, Rfl: 0    ondansetron (ZOFRAN-ODT) 4 mg disintegrating tablet, Take 1 tablet (4 mg total) by mouth every 8 (eight) hours as needed for nausea or vomiting (Patient not taking: Reported on 1/28/2020), Disp: 20 tablet, Rfl: 0    tamsulosin (FLOMAX) 0 4 mg, Take 1 capsule (0 4 mg total) by mouth daily Take once daily until stone passes (Patient not taking: Reported on 1/28/2020), Disp: 10 capsule, Rfl: 0    tamsulosin (FLOMAX) 0 4 mg, Take 1 capsule (0 4 mg total) by mouth daily with dinner for 7 days, Disp: 7 capsule, Rfl: 0      PMH:  Past Medical History:   Diagnosis Date    Kidney stone          PSH  Past Surgical History:   Procedure Laterality Date    CYST REMOVAL      foot    EXCISION / CURETTAGE BONE CYST TALUS / CALCANEUS Left          FH  Family History   Problem Relation Age of Onset    Nephrolithiasis Father     Nephrolithiasis Mother         SH  Social History     Socioeconomic History    Marital status: /Civil Union     Spouse name: None    Number of children: None    Years of education: None    Highest education level: None   Occupational History    None   Social Needs    Financial resource strain: None    Food insecurity:     Worry: None     Inability: None    Transportation needs:     Medical: None     Non-medical: None   Tobacco Use    Smoking status: Never Smoker    Smokeless tobacco: Never Used   Substance and Sexual Activity    Alcohol use: Yes     Frequency: Never    Drug use: Never    Sexual activity: None   Lifestyle    Physical activity:     Days per week: None     Minutes per session: None    Stress: None   Relationships    Social connections:     Talks on phone: None     Gets together: None     Attends Restorationist service: None     Active member of club or organization: None     Attends meetings of clubs or organizations: None     Relationship status: None    Intimate partner violence:     Fear of current or ex partner: None     Emotionally abused: None     Physically abused: None     Forced sexual activity: None   Other Topics Concern    None   Social History Narrative    None          ROS:  Review of Systems      Vitals:  Blood pressure 112/66, pulse 60, height 5' 7" (1 702 m), weight 97 1 kg (214 lb)  Physical Exam:    40-year-old male no acute distress  The abdomen is soft  No inguinal hernias noted  Mild tenderness left lower quadrant  Genital structures are unremarkable testes are unremarkable no masses noted in the scrotum  No hernia defect  There is no CVA tenderness         Lab, Imaging and other studies:  Recent Results (from the past 672 hour(s))   Urinalysis with microscopic    Collection Time: 01/02/20 10:40 AM   Result Value Ref Range    Clarity, UA Clear     Color, UA Yellow     Specific [place], UA 1 021 1 003 - 1 030    pH, UA 6 5 4 5, 5 0, 5 5, 6 0, 6 5, 7 0, 7 5, 8 0    Glucose, UA Negative Negative mg/dl    Ketones, UA Negative Negative mg/dl    Blood, UA Moderate (A) Negative    Protein, UA Negative Negative mg/dl    Nitrite, UA Negative Negative    Bilirubin, UA Negative Negative    Urobilinogen, UA 0 2 0 2, 1 0 E U /dl E U /dl    Leukocytes, UA Negative Negative    WBC, UA None Seen None Seen, 0-5, 5-55, 5-65 /hpf    RBC, UA 20-30 (A) None Seen, 0-5 /hpf    Hyaline Casts, UA None Seen None Seen /lpf    Bacteria, UA None Seen None Seen, Occasional /hpf    Epithelial Cells None Seen None Seen, Occasional /hpf           IMPRESSION:    Left ureteral calculus    PLAN:   I will check an ultrasound and KUB at this time  Continue with tamsulosin and ibuprofen  After evaluation of this imaging study we will make a decision regarding intervention  Please note :  Voice dictation software has been used to create this document  There may be inadvertent transcription errors

## 2020-01-28 NOTE — TELEPHONE ENCOUNTER
I called patient to try and get him scheduled for a renal ultrasound & inform him that his prescription was sent to his pharmacy

## 2020-01-29 ENCOUNTER — HOSPITAL ENCOUNTER (OUTPATIENT)
Dept: RADIOLOGY | Facility: HOSPITAL | Age: 35
Discharge: HOME | End: 2020-01-29
Attending: UROLOGY
Payer: COMMERCIAL

## 2020-01-29 DIAGNOSIS — N20.1 LEFT URETERAL CALCULUS: ICD-10-CM

## 2020-01-29 PROCEDURE — 74018 RADEX ABDOMEN 1 VIEW: CPT

## 2020-01-29 PROCEDURE — 76770 US EXAM ABDO BACK WALL COMP: CPT

## 2020-01-31 ENCOUNTER — TELEPHONE (OUTPATIENT)
Dept: UROLOGY | Facility: MEDICAL CENTER | Age: 35
End: 2020-01-31

## 2020-01-31 DIAGNOSIS — N20.0 NEPHROLITHIASIS: Primary | ICD-10-CM

## 2020-01-31 NOTE — TELEPHONE ENCOUNTER
Spoke to patient who is having pain on and off in extreme amounts  Does have a follow up apt with Waqas Liang on 2/5 to discuss possible surgery  Patient would like to know if possible to get Hydrocodone refill to hold him over till appointment      JOANNA BOND and JUANJOSE in chart and final from 1/29/2020

## 2020-01-31 NOTE — TELEPHONE ENCOUNTER
Patient of Dr Keon Lynn seen at the [facility] office  Patient had Ultrasound on 1/29- reported that the tech did not wand over the specific area of pain  Tech also stated that "I'm not seeing anything"  Had not had pain on 1/29, had experienced increased pain this morning 8/10  Right now is pain is down to 2/10- had medicated  Also experienced hematuria this am- only, none since then  Appointment is already scheduled on 2/5  He explained that he wanted the office to be aware of what had transpired    Thank you

## 2020-01-31 NOTE — TELEPHONE ENCOUNTER
Attempted to call patient, answering machine picked up  It was asked that patient please return phone call to office and the main urology phone number was left in message  Called Radiology and am having imaging from 1/29 read

## 2020-02-03 ENCOUNTER — PREP FOR PROCEDURE (OUTPATIENT)
Dept: UROLOGY | Facility: CLINIC | Age: 35
End: 2020-02-03

## 2020-02-03 ENCOUNTER — TELEPHONE (OUTPATIENT)
Dept: UROLOGY | Facility: CLINIC | Age: 35
End: 2020-02-03

## 2020-02-03 DIAGNOSIS — N20.1 LEFT URETERAL CALCULUS: Primary | ICD-10-CM

## 2020-02-03 RX ORDER — HYDROCODONE BITARTRATE AND ACETAMINOPHEN 5; 325 MG/1; MG/1
1 TABLET ORAL EVERY 6 HOURS PRN
Qty: 15 TABLET | Refills: 0 | Status: SHIPPED | OUTPATIENT
Start: 2020-02-03 | End: 2021-07-15

## 2020-02-03 NOTE — TELEPHONE ENCOUNTER
I spoke to this patient and informed him that the left proximal ureteral calculus had moved very little  There was mild hydronephrosis  In view of the time and persistent intermittent discomfort I recommended that we proceed with definitive stone management  Suggested ureteroscopy and laser lithotripsy    I will refer this to 1 of my colleagues for definitive management

## 2020-02-04 ENCOUNTER — PREP FOR PROCEDURE (OUTPATIENT)
Dept: UROLOGY | Facility: CLINIC | Age: 35
End: 2020-02-04

## 2020-02-04 DIAGNOSIS — N20.1 LEFT URETERAL CALCULUS: Primary | ICD-10-CM

## 2020-02-04 DIAGNOSIS — Z01.818 PRE-OP TESTING: ICD-10-CM

## 2020-02-04 NOTE — TELEPHONE ENCOUNTER
I called pt and scheduled him for a cysto/ L  RGP/ L  URS w/ litho/ L  Stent insert at the [facility] on 2/17/20 with Dr Ella Salamanca  I verbally went over all of pt 's pre op instructions and prep information with him  Pt is aware that he will need to have a urine culture done as soon as possible for this procedure   Per pt 's request I e-mailed him a copy of his surgical packet to [email or web address]

## 2020-02-05 ENCOUNTER — ANESTHESIA EVENT (OUTPATIENT)
Dept: PERIOP | Facility: AMBULARY SURGERY CENTER | Age: 35
End: 2020-02-05
Payer: COMMERCIAL

## 2020-02-06 NOTE — PRE-PROCEDURE INSTRUCTIONS
Pre-Surgery Instructions:   Medication Instructions    HYDROcodone-acetaminophen (NORCO) 5-325 mg per tablet Instructed patient per Anesthesia Guidelines   hydrocortisone 2 5 % cream Instructed patient per Anesthesia Guidelines  Preop,medications and showering instructions using an antibacterial soap reviewed

## 2020-02-07 ENCOUNTER — APPOINTMENT (OUTPATIENT)
Dept: LAB | Facility: CLINIC | Age: 35
End: 2020-02-07
Payer: COMMERCIAL

## 2020-02-07 DIAGNOSIS — Z01.818 PRE-OP TESTING: ICD-10-CM

## 2020-02-07 DIAGNOSIS — N20.1 LEFT URETERAL CALCULUS: ICD-10-CM

## 2020-02-07 PROCEDURE — 87086 URINE CULTURE/COLONY COUNT: CPT

## 2020-02-08 LAB — BACTERIA UR CULT: NORMAL

## 2020-02-14 PROCEDURE — NC001 PR NO CHARGE: Performed by: UROLOGY

## 2020-02-14 NOTE — H&P
HISTORY AND PHYSICAL  ? ? Patient Name: Brenda Paul  Patient MRN: [id number]  Attending Provider: Le Christiansen MD  Service: Urology  Chief Complaint    Left proximal ureteral stone    HPI   Brenda Paul is a 28 y o  male with 4 mm or so stone left proximal ureter on CT scan early December  He thought stone had passed because pain was gone  However, renewed pain and repeat imaging  on KUB shows stone still present in upper left ureter  I plan cysto, left ureteroscopy, laser stone, stent  Potential risks and complications discussed, and informed consent was given by the patient  Medications  Meds/Allergies     No current facility-administered medications for this encounter  Cannot display prior to admission medications because the patient has not been admitted in this contact  No current facility-administered medications for this encounter  Current Outpatient Medications:     HYDROcodone-acetaminophen (NORCO) 5-325 mg per tablet, Take 1 tablet by mouth every 6 (six) hours as needed for painMax Daily Amount: 4 tablets, Disp: 15 tablet, Rfl: 0    hydrocortisone 2 5 % cream, Apply topically 2 (two) times a day , Disp: , Rfl:   Review of Systems  10 point review of systems negative except as noted in HPI  Allergies  No Known Allergies  PMH  Past Medical History:   Diagnosis Date    Anesthesia complication     " Slow to wake up after anesthesia" Was admitted after foot surgery due to sleepiness    Kidney stone      Past surgical history  Past Surgical History:   Procedure Laterality Date    EXCISION / CURETTAGE BONE CYST TALUS / CALCANEUS Left     WISDOM TOOTH EXTRACTION       Social history  Social History     Tobacco Use    Smoking status: Never Smoker    Smokeless tobacco: Never Used   Substance Use Topics    Alcohol use: Yes     Frequency: 2-4 times a month     Drinks per session: 1 or 2     Binge frequency: Never     Comment: Socially    Drug use: Never     ?   Physical Exam     vs  General appearance: alert and oriented, in no acute distress  Head: Normocephalic, without obvious abnormality, atraumatic  Eyes: conjunctivae/corneas clear  PERRL, EOM's intact  Fundi benign  Throat: lips, mucosa, and tongue normal; teeth and gums normal  Neck: no adenopathy, no carotid bruit, no JVD, supple, symmetrical, trachea midline and thyroid not enlarged, symmetric, no tenderness/mass/nodules  Back: symmetric, no curvature  ROM normal  No CVA tenderness    Lungs: clear to auscultation bilaterally  Heart: regular rate and rhythm, S1, S2 normal, no murmur, click, rub or gallop  Abdomen: soft, non-tender; bowel sounds normal; no masses,  no organomegaly  Laurie Carcamo MD

## 2020-02-17 ENCOUNTER — TELEPHONE (OUTPATIENT)
Dept: UROLOGY | Facility: MEDICAL CENTER | Age: 35
End: 2020-02-17

## 2020-02-17 ENCOUNTER — HOSPITAL ENCOUNTER (OUTPATIENT)
Facility: AMBULARY SURGERY CENTER | Age: 35
Setting detail: OUTPATIENT SURGERY
Discharge: HOME | End: 2020-02-17
Attending: UROLOGY | Admitting: UROLOGY
Payer: COMMERCIAL

## 2020-02-17 ENCOUNTER — APPOINTMENT (OUTPATIENT)
Dept: RADIOLOGY | Facility: AMBULARY SURGERY CENTER | Age: 35
End: 2020-02-17
Payer: COMMERCIAL

## 2020-02-17 ENCOUNTER — ANESTHESIA (OUTPATIENT)
Dept: PERIOP | Facility: AMBULARY SURGERY CENTER | Age: 35
End: 2020-02-17
Payer: COMMERCIAL

## 2020-02-17 ENCOUNTER — TELEPHONE (OUTPATIENT)
Dept: OTHER | Facility: HOSPITAL | Age: 35
End: 2020-02-17

## 2020-02-17 ENCOUNTER — TELEPHONE (OUTPATIENT)
Dept: OTHER | Facility: OTHER | Age: 35
End: 2020-02-17

## 2020-02-17 VITALS
HEART RATE: 81 BPM | HEIGHT: 67 IN | TEMPERATURE: 97.3 F | DIASTOLIC BLOOD PRESSURE: 60 MMHG | BODY MASS INDEX: 33.12 KG/M2 | WEIGHT: 211 LBS | OXYGEN SATURATION: 100 % | RESPIRATION RATE: 18 BRPM | SYSTOLIC BLOOD PRESSURE: 117 MMHG

## 2020-02-17 DIAGNOSIS — R30.0 DYSURIA: Primary | ICD-10-CM

## 2020-02-17 DIAGNOSIS — N20.1 LEFT URETERAL CALCULUS: ICD-10-CM

## 2020-02-17 PROCEDURE — C2617 STENT, NON-COR, TEM W/O DEL: HCPCS | Performed by: UROLOGY

## 2020-02-17 PROCEDURE — 74420 UROGRAPHY RTRGR +-KUB: CPT

## 2020-02-17 PROCEDURE — 82360 CALCULUS ASSAY QUANT: CPT | Performed by: UROLOGY

## 2020-02-17 PROCEDURE — NC001 PR NO CHARGE: Performed by: UROLOGY

## 2020-02-17 PROCEDURE — 52356 CYSTO/URETERO W/LITHOTRIPSY: CPT | Performed by: UROLOGY

## 2020-02-17 PROCEDURE — C1769 GUIDE WIRE: HCPCS | Performed by: UROLOGY

## 2020-02-17 DEVICE — STENT CONTOUR INJ 6FR 30CM: Type: IMPLANTABLE DEVICE | Site: URETER | Status: FUNCTIONAL

## 2020-02-17 RX ORDER — OXYCODONE HYDROCHLORIDE AND ACETAMINOPHEN 5; 325 MG/1; MG/1
2 TABLET ORAL EVERY 4 HOURS PRN
Status: DISCONTINUED | OUTPATIENT
Start: 2020-02-17 | End: 2020-02-17 | Stop reason: HOSPADM

## 2020-02-17 RX ORDER — HYDROCODONE BITARTRATE AND ACETAMINOPHEN 5; 325 MG/1; MG/1
1-2 TABLET ORAL EVERY 6 HOURS PRN
Qty: 20 TABLET | Refills: 0 | Status: SHIPPED | OUTPATIENT
Start: 2020-02-17 | End: 2020-02-27

## 2020-02-17 RX ORDER — SODIUM CHLORIDE, SODIUM LACTATE, POTASSIUM CHLORIDE, CALCIUM CHLORIDE 600; 310; 30; 20 MG/100ML; MG/100ML; MG/100ML; MG/100ML
INJECTION, SOLUTION INTRAVENOUS CONTINUOUS PRN
Status: DISCONTINUED | OUTPATIENT
Start: 2020-02-17 | End: 2020-02-17 | Stop reason: SURG

## 2020-02-17 RX ORDER — LIDOCAINE HYDROCHLORIDE 10 MG/ML
INJECTION, SOLUTION EPIDURAL; INFILTRATION; INTRACAUDAL; PERINEURAL AS NEEDED
Status: DISCONTINUED | OUTPATIENT
Start: 2020-02-17 | End: 2020-02-17 | Stop reason: SURG

## 2020-02-17 RX ORDER — METOCLOPRAMIDE HYDROCHLORIDE 5 MG/ML
10 INJECTION INTRAMUSCULAR; INTRAVENOUS ONCE AS NEEDED
Status: DISCONTINUED | OUTPATIENT
Start: 2020-02-17 | End: 2020-02-17 | Stop reason: HOSPADM

## 2020-02-17 RX ORDER — FENTANYL CITRATE/PF 50 MCG/ML
50 SYRINGE (ML) INJECTION
Status: DISCONTINUED | OUTPATIENT
Start: 2020-02-17 | End: 2020-02-17 | Stop reason: HOSPADM

## 2020-02-17 RX ORDER — PROPOFOL 10 MG/ML
INJECTION, EMULSION INTRAVENOUS AS NEEDED
Status: DISCONTINUED | OUTPATIENT
Start: 2020-02-17 | End: 2020-02-17 | Stop reason: SURG

## 2020-02-17 RX ORDER — ONDANSETRON 2 MG/ML
INJECTION INTRAMUSCULAR; INTRAVENOUS AS NEEDED
Status: DISCONTINUED | OUTPATIENT
Start: 2020-02-17 | End: 2020-02-17 | Stop reason: SURG

## 2020-02-17 RX ORDER — DEXAMETHASONE SODIUM PHOSPHATE 10 MG/ML
INJECTION, SOLUTION INTRAMUSCULAR; INTRAVENOUS AS NEEDED
Status: DISCONTINUED | OUTPATIENT
Start: 2020-02-17 | End: 2020-02-17 | Stop reason: SURG

## 2020-02-17 RX ORDER — HYDROMORPHONE HCL/PF 1 MG/ML
0.5 SYRINGE (ML) INJECTION
Status: DISCONTINUED | OUTPATIENT
Start: 2020-02-17 | End: 2020-02-17 | Stop reason: HOSPADM

## 2020-02-17 RX ORDER — CEPHALEXIN 500 MG/1
500 CAPSULE ORAL EVERY 12 HOURS SCHEDULED
Qty: 10 CAPSULE | Refills: 0 | Status: SHIPPED | OUTPATIENT
Start: 2020-02-17 | End: 2020-02-22

## 2020-02-17 RX ORDER — MAGNESIUM HYDROXIDE 1200 MG/15ML
LIQUID ORAL AS NEEDED
Status: DISCONTINUED | OUTPATIENT
Start: 2020-02-17 | End: 2020-02-17 | Stop reason: HOSPADM

## 2020-02-17 RX ORDER — ONDANSETRON 2 MG/ML
4 INJECTION INTRAMUSCULAR; INTRAVENOUS ONCE AS NEEDED
Status: DISCONTINUED | OUTPATIENT
Start: 2020-02-17 | End: 2020-02-17 | Stop reason: HOSPADM

## 2020-02-17 RX ORDER — FENTANYL CITRATE 50 UG/ML
INJECTION, SOLUTION INTRAMUSCULAR; INTRAVENOUS AS NEEDED
Status: DISCONTINUED | OUTPATIENT
Start: 2020-02-17 | End: 2020-02-17 | Stop reason: SURG

## 2020-02-17 RX ORDER — KETOROLAC TROMETHAMINE 30 MG/ML
INJECTION, SOLUTION INTRAMUSCULAR; INTRAVENOUS AS NEEDED
Status: DISCONTINUED | OUTPATIENT
Start: 2020-02-17 | End: 2020-02-17 | Stop reason: SURG

## 2020-02-17 RX ORDER — PHENAZOPYRIDINE HYDROCHLORIDE 200 MG/1
200 TABLET, FILM COATED ORAL 3 TIMES DAILY PRN
Qty: 10 TABLET | Refills: 0 | Status: SHIPPED | OUTPATIENT
Start: 2020-02-17 | End: 2022-01-26

## 2020-02-17 RX ORDER — OXYCODONE HYDROCHLORIDE AND ACETAMINOPHEN 5; 325 MG/1; MG/1
1 TABLET ORAL EVERY 4 HOURS PRN
Status: DISCONTINUED | OUTPATIENT
Start: 2020-02-17 | End: 2020-02-17 | Stop reason: HOSPADM

## 2020-02-17 RX ORDER — CEFAZOLIN SODIUM 2 G/50ML
2000 SOLUTION INTRAVENOUS ONCE
Status: COMPLETED | OUTPATIENT
Start: 2020-02-17 | End: 2020-02-17

## 2020-02-17 RX ORDER — OXYBUTYNIN CHLORIDE 5 MG/1
TABLET ORAL
Qty: 15 TABLET | Refills: 0 | Status: SHIPPED | OUTPATIENT
Start: 2020-02-17 | End: 2022-01-26

## 2020-02-17 RX ADMIN — ONDANSETRON 4 MG: 2 INJECTION INTRAMUSCULAR; INTRAVENOUS at 11:50

## 2020-02-17 RX ADMIN — FENTANYL CITRATE 50 MCG: 50 INJECTION, SOLUTION INTRAMUSCULAR; INTRAVENOUS at 12:36

## 2020-02-17 RX ADMIN — KETOROLAC TROMETHAMINE 15 MG: 30 INJECTION, SOLUTION INTRAMUSCULAR at 12:11

## 2020-02-17 RX ADMIN — FENTANYL CITRATE 50 MCG: 50 INJECTION, SOLUTION INTRAMUSCULAR; INTRAVENOUS at 12:47

## 2020-02-17 RX ADMIN — CEFAZOLIN SODIUM 2000 MG: 2 SOLUTION INTRAVENOUS at 11:45

## 2020-02-17 RX ADMIN — PROPOFOL 200 MG: 10 INJECTION, EMULSION INTRAVENOUS at 11:47

## 2020-02-17 RX ADMIN — FENTANYL CITRATE 25 MCG: 50 INJECTION, SOLUTION INTRAMUSCULAR; INTRAVENOUS at 12:09

## 2020-02-17 RX ADMIN — SODIUM CHLORIDE, SODIUM LACTATE, POTASSIUM CHLORIDE, AND CALCIUM CHLORIDE: .6; .31; .03; .02 INJECTION, SOLUTION INTRAVENOUS at 11:45

## 2020-02-17 RX ADMIN — LIDOCAINE HYDROCHLORIDE 50 MG: 10 INJECTION, SOLUTION EPIDURAL; INFILTRATION; INTRACAUDAL; PERINEURAL at 11:47

## 2020-02-17 RX ADMIN — FENTANYL CITRATE 25 MCG: 50 INJECTION, SOLUTION INTRAMUSCULAR; INTRAVENOUS at 11:52

## 2020-02-17 RX ADMIN — DEXAMETHASONE SODIUM PHOSPHATE 4 MG: 10 INJECTION, SOLUTION INTRAMUSCULAR; INTRAVENOUS at 11:51

## 2020-02-17 RX ADMIN — OXYCODONE HYDROCHLORIDE AND ACETAMINOPHEN 1 TABLET: 5; 325 TABLET ORAL at 13:24

## 2020-02-17 NOTE — TELEPHONE ENCOUNTER
Received page from patient reporting severe dysuria  He is status post ureteroscopy this morning  Instructed patient to take oxybutynin as prescribed and increase water intake  Prescription for pyridium also sent to his pharmacy  Reviewed administration instructions and side effects

## 2020-02-17 NOTE — DISCHARGE SUMMARY
Discharge Summary - Manpreet Andujar 28 y o  male MRN: [id number]    Admission Date: 2/17/2020     Admitting Diagnosis: Left ureteral calculus [N20 1]    Procedures Performed: left ureteroscopy, laser stone, stent    Patient underwent planned outpt surgery and recovered without complication  Discharged in good condition  Medications were prescribed  Pt knows to have office follow-up at the appropriate time

## 2020-02-17 NOTE — ANESTHESIA POSTPROCEDURE EVALUATION
Post-Op Assessment Note    CV Status:  Stable    Pain management: adequate     Mental Status:  Awake and lethargic   Hydration Status:  Stable   PONV Controlled:  None   Airway Patency:  Patent   Post Op Vitals Reviewed: Yes      Staff: CRNA           BP   127/70   Temp   98   Pulse  74   Resp      SpO2   100

## 2020-02-17 NOTE — DISCHARGE INSTRUCTIONS
ALL YOUR  PREVIOUS MEDS ARE THE SAME  NEW MEDS:  Oxybutynin if needed for bladder pressure/frequency  Cephalexin antibiotic twice per day for 5 days  BE CAREFUL NOT TO PULL THE STRING  EXPECT SOME BLOOD IN URINE, BURNING, FREQUENT URINATION

## 2020-02-17 NOTE — ANESTHESIA PREPROCEDURE EVALUATION
Review of Systems/Medical History  Patient summary reviewed  Chart reviewed  No history of anesthetic complications     Cardiovascular  Negative cardio ROS Exercise tolerance (METS): >4,     Pulmonary  Negative pulmonary ROS Not a smoker ,        GI/Hepatic  Negative GI/hepatic ROS          Kidney stones,        Endo/Other  No diabetes ,   Obesity    GYN  Negative gynecology ROS          Hematology  Negative hematology ROS      Musculoskeletal  Negative musculoskeletal ROS        Neurology  Negative neurology ROS      Psychology   Negative psychology ROS              Physical Exam    Airway    Mallampati score: I  TM Distance: >3 FB  Neck ROM: full     Dental   No notable dental hx     Cardiovascular  Comment: Negative ROS, Rhythm: regular, Rate: normal, Cardiovascular exam normal    Pulmonary  Pulmonary exam normal Breath sounds clear to auscultation,     Other Findings        Anesthesia Plan  ASA Score- 2     Anesthesia Type- general with ASA Monitors  Additional Monitors:   Airway Plan: LMA  Plan Factors-Patient not instructed to abstain from smoking on day of procedure  Patient did not smoke on day of surgery  Induction- intravenous  Postoperative Plan- Plan for postoperative opioid use  Informed Consent- Anesthetic plan and risks discussed with patient  I personally reviewed this patient with the CRNA  Discussed and agreed on the Anesthesia Plan with the CRNA  Gerber Azevedo

## 2020-02-17 NOTE — INTERVAL H&P NOTE
H&P reviewed  After examining the patient I find no changes in the patients condition since the H&P had been written      Vitals:    02/17/20 1048   BP: 132/90   Pulse: 64   Resp: 18   Temp: 97 6 °F (36 4 °C)   SpO2: 97%

## 2020-02-17 NOTE — TELEPHONE ENCOUNTER
Call placed to patient and stent with string removal scheduled on Friday 2/21/2020 with Nurse at 1030am  Patient is requesting if possible to have stent removed in the [facility] office as this is much closer for him  Advised patient that I can check with [facility] office if appointment can be scheduled there

## 2020-02-17 NOTE — TELEPHONE ENCOUNTER
Patient had surgery with Dr Chary Castillo  Needs to schedule stent removal   Dr Jaleesa Wiley surgery note states stent can be removed this Friday, 02/21/20  There are two openings on the nurse's schedule right now for Friday  He can be reached at [phone]

## 2020-02-17 NOTE — OP NOTE
OPERATIVE REPORT  PATIENT NAME: Julia Estrella    :  1985  MRN: [id number]  Pt Location: [facility]  OR ROOM 04    SURGERY DATE: 2020    Surgeon(s) and Role:     Mere Esteves MD - Primary    Preop Diagnosis:  Left ureteral calculus [N20 1]    Post-Op Diagnosis Codes:     * Left ureteral calculus [N20 1]    Procedure(s) (LRB):  CYSTOSCOPY URETEROSCOPY WITH LITHOTRIPSY HOLMIUM LASER, RETROGRADE PYELOGRAM AND INSERTION STENT URETERALAND STONE BASKET (Left)    Specimen(s):  ID Type Source Tests Collected by Time Destination   A : left ureteral stone Calculus Ureter, Left STONE ANALYSIS Clement Hamilton MD 2020 1211        Estimated Blood Loss:   Minimal    Drains:  * No LDAs found *    Anesthesia Type:   General    Operative Indications:  Left ureteral calculus [N20 1]      Operative Findings:  Stone 7 cm up from bladder    Complications:   None    Procedure and Technique:      PLAN FOR STENT: will be removed this Friday with string  The patient was brought into the OR, properly identified and positioned on the table  General anesthesia was induced, and he was prepped using betadine solution and draped in lithotomy position  The 22F scope was introduced in the urethra, which showed no strictures  The prostate had minimal hyperplasia  The bladder was inspected and had no lesions, stones, or tumors   The left ureteral orifice was identified and retrograde pyelogram performed, showing stone in lower ureter  one wire placed up the ureter  The rigid ureteroscope was introduced and carefully advanced up to stone  The Holmium laser fiber was introduced thru the scope and advanced to the edge of stone  Using various laser settings, stone was lasered into numerous small particles  Care was taken not to laser tissue  Large particles were basketed, and  remaining particles appeared small enough to pass around the stent  Repeat ureteroscopy showed no trauma to the ureter      The scope was removed from the ureter, and the cystoscope was used to place a 6F Contour VL stent in the ureter, with the upper coils in the renal pelvis, and the distal coil in the bladder  Retrograde pyelogram on that side confirmed good position and no extravasation of contrast    The patient was awaken from anesthesia and taken to the PACU in good condition        I was present for the entire procedure    Patient Disposition:  PACU     SIGNATURE: Tito Olivas MD  DATE: February 17, 2020  TIME: 12:24 PM

## 2020-02-21 ENCOUNTER — PROCEDURE VISIT (OUTPATIENT)
Dept: UROLOGY | Facility: AMBULATORY SURGERY CENTER | Age: 35
End: 2020-02-21
Payer: COMMERCIAL

## 2020-02-21 VITALS
WEIGHT: 211 LBS | BODY MASS INDEX: 33.12 KG/M2 | SYSTOLIC BLOOD PRESSURE: 130 MMHG | HEIGHT: 67 IN | DIASTOLIC BLOOD PRESSURE: 70 MMHG | HEART RATE: 71 BPM

## 2020-02-21 DIAGNOSIS — N20.1 LEFT URETERAL CALCULUS: Primary | ICD-10-CM

## 2020-02-21 PROCEDURE — 99211 OFF/OP EST MAY X REQ PHY/QHP: CPT | Performed by: UROLOGY

## 2020-02-24 NOTE — PROGRESS NOTES
2/21/2020  Porter Liu is a 28 y o  male  [id number]        Diagnosis  Chief Complaint     Nephrolithiasis          Patient is s/p left ureteroscopy with stone extraction on 02/17/2020 with Dr Anaya Mcrae  F/U 8-12 weeks with AP  KUB prior      Procedure Stent with String Removal    Vitals:    02/21/20 1025   BP: 130/70   BP Location: Left arm   Patient Position: Sitting   Cuff Size: Standard   Pulse: 71   Weight: 95 7 kg (211 lb)   Height: 5' 7" (1 702 m)       Stent with string removed intact without difficulty  Reviewed post stent removal symptoms including flank pain, dysuria, and hematuria  Instructed patient to increase oral fluid intake  Encouraged the use of NSAIDS and other prescribed pain medication as needed for discomfort  Patient instructed to call the office or report to the ER for uncontrolled pain, fever, chills, nausea or vomiting       CORRINE Bacon, [address] [place] [address] [place]

## 2020-03-03 LAB
CALCIUM OXALATE DIHYDRATE MFR STONE IR: 80 %
COLOR STONE: NORMAL
COM MFR STONE: 20 %
COMMENT-STONE3: NORMAL
COMPOSITION: NORMAL
LABORATORY COMMENT REPORT: NORMAL
PHOTO: NORMAL
SIZE STONE: NORMAL MM
SPEC SOURCE SUBJ: NORMAL
STONE ANALYSIS-IMP: NORMAL
WT STONE: 9.2 MG

## 2020-04-23 ENCOUNTER — TELEPHONE (OUTPATIENT)
Dept: UROLOGY | Facility: CLINIC | Age: 35
End: 2020-04-23

## 2020-05-04 ENCOUNTER — TELEMEDICINE (OUTPATIENT)
Dept: UROLOGY | Facility: CLINIC | Age: 35
End: 2020-05-04
Payer: COMMERCIAL

## 2020-05-04 DIAGNOSIS — N20.0 CALCULUS OF KIDNEY: Primary | ICD-10-CM

## 2020-05-04 PROCEDURE — 99213 OFFICE O/P EST LOW 20 MIN: CPT | Performed by: PHYSICIAN ASSISTANT

## 2021-04-16 DIAGNOSIS — Z23 ENCOUNTER FOR IMMUNIZATION: ICD-10-CM

## 2021-04-30 ENCOUNTER — HOSPITAL ENCOUNTER (EMERGENCY)
Facility: HOSPITAL | Age: 36
Discharge: HOME | End: 2021-04-30
Attending: EMERGENCY MEDICINE
Payer: COMMERCIAL

## 2021-04-30 ENCOUNTER — IMMUNIZATIONS (OUTPATIENT)
Dept: FAMILY MEDICINE CLINIC | Facility: HOSPITAL | Age: 36
End: 2021-04-30

## 2021-04-30 VITALS
SYSTOLIC BLOOD PRESSURE: 137 MMHG | RESPIRATION RATE: 16 BRPM | DIASTOLIC BLOOD PRESSURE: 67 MMHG | OXYGEN SATURATION: 100 % | HEART RATE: 90 BPM

## 2021-04-30 DIAGNOSIS — Z23 ENCOUNTER FOR IMMUNIZATION: Primary | ICD-10-CM

## 2021-04-30 DIAGNOSIS — R55 SYNCOPE: Primary | ICD-10-CM

## 2021-04-30 PROCEDURE — 91300 SARS-COV-2 / COVID-19 MRNA VACCINE (PFIZER-BIONTECH) 30 MCG: CPT

## 2021-04-30 PROCEDURE — 93005 ELECTROCARDIOGRAM TRACING: CPT

## 2021-04-30 PROCEDURE — 99285 EMERGENCY DEPT VISIT HI MDM: CPT | Performed by: EMERGENCY MEDICINE

## 2021-04-30 PROCEDURE — 0001A SARS-COV-2 / COVID-19 MRNA VACCINE (PFIZER-BIONTECH) 30 MCG: CPT

## 2021-04-30 PROCEDURE — 99284 EMERGENCY DEPT VISIT MOD MDM: CPT

## 2021-04-30 NOTE — DISCHARGE INSTRUCTIONS
DIAGNOSIS; syncope - passing out episode after vaccination       - activity as tolerated      - please return to  the er for any further passing out- or any new/ worsening/concerning symptoms to you

## 2021-04-30 NOTE — ED PROCEDURE NOTE
PROCEDURE  ECG 12 Lead Documentation Only    Date/Time: 4/30/2021 1:14 PM  Performed by: Johnson Golden MD  Authorized by: Johnson Golden MD     Indications / Diagnosis:  NEAR SYNCOPE  ECG reviewed by me, the ED Provider: yes    Patient location:  ED and bedside  Previous ECG:     Previous ECG:  Unavailable    Comparison to cardiac monitor: Yes    Interpretation:     Interpretation: non-specific    Rate:     ECG rate:  76    ECG rate assessment: normal    Rhythm:     Rhythm: sinus rhythm    Ectopy:     Ectopy: none    QRS:     QRS axis:  Normal    QRS intervals:   Wide  Conduction:     Conduction: abnormal      Abnormal conduction: incomplete RBBB    ST segments:     ST segments:  Normal  T waves:     T waves: flattening      Flattening:  AVL, V1 and V2  Q waves:     Q waves:  V1  Other findings:     Other findings: U wave    Comments:       NO ECG  SIGNS OF ISCHEMIA/ INJURY / -  NO ECG SIGNS OF SHORT- LONG QTC/ /WPW/BRUGADA SYNDROME/ HCM/ EPSILON WAVES         Johnson Golden MD  04/30/21 4642 .  VITAL SIGNS:  T(C): 36.7 (12-26-18 @ 14:23), Max: 36.7 (12-26-18 @ 14:23)  T(F): 98.1 (12-26-18 @ 14:23), Max: 98.1 (12-26-18 @ 14:23)  HR: 74 (12-26-18 @ 14:23) (74 - 75)  BP: 176/77 (12-26-18 @ 14:23) (153/74 - 176/77)  BP(mean): --  RR: 16 (12-26-18 @ 14:23) (16 - 16)  SpO2: 99% (12-26-18 @ 14:23) (99% - 99%)  Wt(kg): --    PHYSICAL EXAM:    Constitutional: well developed, well nourished, resting comfortably in bed; NAD  HEENT: NC/AT, PERRL, EOMI, clear conjunctiva, no nasal discharge; uvula midline, no oropharyngeal erythema or exudates; MMM  Neck: supple; no JVD  Respiratory: CTA B/L; no W/R/R, no retractions, no crackles  Cardiac: +S1/S2; RRR; no M/R/G;  Gastrointestinal: soft, NT/ND; no rebound or guarding; bowel sounds present x4  Extremities: RLE: warmth, erythema at ankle to midshin, erythema decreased from original markings, mild swelling at ankle, skin peeling overlying the erythema  Vascular: 2+ radial, DP/PT pulses B/L  Neurologic: AAOx3; CNII-XII grossly intact; no focal deficits  Psychiatric: affect and characteristics of appearance, verbalizations, behaviors are appropriate .  VITAL SIGNS:  T(C): 36.7 (12-26-18 @ 14:23), Max: 36.7 (12-26-18 @ 14:23)  T(F): 98.1 (12-26-18 @ 14:23), Max: 98.1 (12-26-18 @ 14:23)  HR: 74 (12-26-18 @ 14:23) (74 - 75)  BP: 176/77 (12-26-18 @ 14:23) (153/74 - 176/77)  BP(mean): --  RR: 16 (12-26-18 @ 14:23) (16 - 16)  SpO2: 99% (12-26-18 @ 14:23) (99% - 99%)  Wt(kg): --    PHYSICAL EXAM:    Constitutional: well developed, well nourished, resting comfortably in bed; NAD  HEENT: NC/AT, PERRL, EOMI, clear conjunctiva, no nasal discharge; uvula midline, no oropharyngeal erythema or exudates; MMM  Neck: supple; no JVD  Respiratory: CTA B/L; no W/R/R, no retractions, no crackles  Cardiac: +S1/S2; RRR; no M/R/G;  Gastrointestinal: soft, NT/ND; no rebound or guarding; bowel sounds present x4  Extremities: RLE: warmth, erythema at ankle to midshin, erythema decreased from original markings, mild swelling at ankle, skin peeling overlying the erythema, no fluctuance  Vascular: 2+ radial, DP/PT pulses B/L  Neurologic: AAOx3; CNII-XII grossly intact; no focal deficits, Motor exam 5/5 throughout, Sensory exam to light touch intact throughout  Psychiatric: affect and characteristics of appearance, verbalizations, behaviors are appropriate

## 2021-05-02 LAB
ATRIAL RATE: 76 BPM
P AXIS: 69 DEGREES
PR INTERVAL: 154 MS
QRS AXIS: 73 DEGREES
QRSD INTERVAL: 116 MS
QT INTERVAL: 402 MS
QTC INTERVAL: 452 MS
T WAVE AXIS: 55 DEGREES
VENTRICULAR RATE: 76 BPM

## 2021-05-02 PROCEDURE — 93010 ELECTROCARDIOGRAM REPORT: CPT | Performed by: INTERNAL MEDICINE

## 2021-05-04 NOTE — ED PROVIDER NOTES
History  Chief Complaint   Patient presents with    Syncope     Near syncope post 1st covid vaccine     39 yr male soon after getting first covid vaccine - began to feel lightheaded and then with brief syncopal episode- caught by  Worker- no trauma- no sz activity - now back to baseline feels much improved with no comps       History provided by:  Patient   used: No    Syncope  Episode history:  Single  Progression:  Resolved  Associated symptoms: diaphoresis and nausea    Associated symptoms: no dizziness, no fever, no headaches, no seizures, no vomiting and no weakness        Prior to Admission Medications   Prescriptions Last Dose Informant Patient Reported? Taking?    HYDROcodone-acetaminophen (NORCO) 5-325 mg per tablet  Self No No   Sig: Take 1 tablet by mouth every 6 (six) hours as needed for painMax Daily Amount: 4 tablets   Patient not taking: Reported on 2/21/2020   hydrocortisone 2 5 % cream  Self Yes No   Sig: Apply topically 2 (two) times a day    oxybutynin (DITROPAN) 5 mg tablet  Self No No   Sig: Every 6-8 hours if needed for bladder pressure or spasm   Patient not taking: Reported on 2/21/2020   phenazopyridine (PYRIDIUM) 200 mg tablet  Self No No   Sig: Take 1 tablet (200 mg total) by mouth 3 (three) times a day as needed for bladder spasms   Patient not taking: Reported on 2/21/2020      Facility-Administered Medications: None       Past Medical History:   Diagnosis Date    Anesthesia complication     " Slow to wake up after anesthesia" Was admitted after foot surgery due to sleepiness    Kidney stone        Past Surgical History:   Procedure Laterality Date    EXCISION / CURETTAGE BONE CYST TALUS / CALCANEUS Left     FL RETROGRADE PYELOGRAM  2/17/2020    DE CYSTO/URETERO W/LITHOTRIPSY &INDWELL STENT INSRT Left 2/17/2020    Procedure: CYSTOSCOPY URETEROSCOPY WITH LITHOTRIPSY HOLMIUM LASER, RETROGRADE PYELOGRAM AND INSERTION STENT URETERALAND STONE BASKET;  Surgeon: Micheal Regalado Sarah Oleary MD;  [facility];  Service: Urology    WISDOM TOOTH EXTRACTION         Family History   Problem Relation Age of Onset    Nephrolithiasis Father     Nephrolithiasis Mother      I have reviewed and agree with the history as documented  E-Cigarette/Vaping     E-Cigarette/Vaping Substances     Social History     Tobacco Use    Smoking status: Never Smoker    Smokeless tobacco: Never Used   Substance Use Topics    Alcohol use: Yes     Frequency: 2-4 times a month     Drinks per session: 1 or 2     Binge frequency: Never     Comment: Socially    Drug use: Never       Review of Systems   Constitutional: Positive for diaphoresis  Negative for activity change, appetite change, chills, fatigue, fever and unexpected weight change  HENT: Negative  Eyes: Negative  Respiratory: Negative  Cardiovascular: Positive for syncope  Gastrointestinal: Positive for nausea  Negative for abdominal distention, abdominal pain, anal bleeding, blood in stool, constipation, diarrhea, rectal pain and vomiting  Endocrine: Negative  Genitourinary: Negative  Musculoskeletal: Negative  Skin: Negative  Allergic/Immunologic: Negative  Neurological: Positive for syncope and light-headedness  Negative for dizziness, tremors, seizures, facial asymmetry, speech difficulty, weakness, numbness and headaches  Hematological: Negative  Psychiatric/Behavioral: Negative  Physical Exam  Physical Exam  Vitals signs and nursing note reviewed  Constitutional:       General: He is not in acute distress  Appearance: Normal appearance  He is not ill-appearing, toxic-appearing or diaphoretic  Comments: avss-- pulse ox 100 % on ra- interpretation is normal- no intervention - in nad    HENT:      Head: Normocephalic and atraumatic  Nose: Nose normal       Mouth/Throat:      Mouth: Mucous membranes are moist    Eyes:      General: No scleral icterus  Right eye: No discharge  Left eye: No discharge  Extraocular Movements: Extraocular movements intact  Conjunctiva/sclera: Conjunctivae normal       Pupils: Pupils are equal, round, and reactive to light  Comments: Mm pink   Neck:      Musculoskeletal: Normal range of motion and neck supple  No neck rigidity or muscular tenderness  Vascular: No carotid bruit  Comments: No pmt c/t/l/s spine   Cardiovascular:      Rate and Rhythm: Normal rate and regular rhythm  Pulses: Normal pulses  Heart sounds: Normal heart sounds  No murmur  No friction rub  No gallop  Pulmonary:      Effort: Pulmonary effort is normal  No respiratory distress  Breath sounds: Normal breath sounds  No stridor  No wheezing, rhonchi or rales  Chest:      Chest wall: No tenderness  Abdominal:      General: Bowel sounds are normal  There is no distension  Palpations: Abdomen is soft  There is no mass  Tenderness: There is no abdominal tenderness  There is no right CVA tenderness, left CVA tenderness, guarding or rebound  Hernia: No hernia is present  Comments: Sot nt/nd- no hsm- no cva tenderness/ no peritoneal signs   Musculoskeletal: Normal range of motion  General: No swelling, tenderness, deformity or signs of injury  Right lower leg: No edema  Left lower leg: No edema  Comments: Normal/ equal bilateral radial/dp pulses- no ble edema/calf tenderness/asym/ erythema   Lymphadenopathy:      Cervical: No cervical adenopathy  Skin:     General: Skin is warm and dry  Capillary Refill: Capillary refill takes less than 2 seconds  Coloration: Skin is not jaundiced or pale  Findings: No bruising, erythema, lesion or rash  Neurological:      General: No focal deficit present  Mental Status: He is alert and oriented to person, place, and time  Mental status is at baseline  Cranial Nerves: No cranial nerve deficit  Sensory: No sensory deficit        Motor: No weakness  Coordination: Coordination normal       Gait: Gait normal       Comments: Normal non focal neuro exam   Psychiatric:         Mood and Affect: Mood normal          Behavior: Behavior normal          Thought Content: Thought content normal          Judgment: Judgment normal          Vital Signs  ED Triage Vitals   Temp Pulse Respirations Blood Pressure SpO2   -- 04/30/21 1225 04/30/21 1225 04/30/21 1225 04/30/21 1225    99 20 (!) 148/102 100 %      Temp src Heart Rate Source Patient Position - Orthostatic VS BP Location FiO2 (%)   -- 04/30/21 1225 04/30/21 1233 04/30/21 1233 --    Monitor Sitting Right arm       Pain Score       --                  Vitals:    04/30/21 1225 04/30/21 1233   BP: (!) 148/102 137/67   Pulse: 99 90   Patient Position - Orthostatic VS:  Sitting         Visual Acuity      ED Medications  Medications - No data to display    Diagnostic Studies  Results Reviewed     None                 No orders to display              Procedures  Procedures         ED Course  ED Course as of May 04 1613   Fri Apr 30, 2021   1400 - ER MD NOTE- PT- RE-EVALUATED- FEELS IMPROVED -- TOLERATED AMBULATION WITH ER MD WITH NO COMPLAINTS- PT ASYMPTOMATIC UPON ER D/C                                              MDM    Disposition  Final diagnoses:   Syncope     Time reflects when diagnosis was documented in both MDM as applicable and the Disposition within this note     Time User Action Codes Description Comment    4/30/2021  2:03 PM Juan Delgado Add [R55] Syncope       ED Disposition     ED Disposition Condition Date/Time Comment    Discharge Stable Fri Apr 30, 2021  [facility]              Follow-up Information    None         Discharge Medication List as of 4/30/2021  2:04 PM      CONTINUE these medications which have NOT CHANGED    Details   HYDROcodone-acetaminophen (NORCO) 5-325 mg per tablet Take 1 tablet by mouth every 6 (six) hours as needed for painMax Daily Amount: 4 tablets, Starting Mon 2/3/2020, Normal      hydrocortisone 2 5 % cream Apply topically 2 (two) times a day , Starting Mon 12/16/2019, Historical Med      oxybutynin (DITROPAN) 5 mg tablet Every 6-8 hours if needed for bladder pressure or spasm, Normal      phenazopyridine (PYRIDIUM) 200 mg tablet Take 1 tablet (200 mg total) by mouth 3 (three) times a day as needed for bladder spasms, Starting Mon 2/17/2020, Normal           No discharge procedures on file      PDMP Review     None          ED Provider  Electronically Signed by           Kushal Etienne MD  05/04/21 9168

## 2021-05-24 ENCOUNTER — IMMUNIZATIONS (OUTPATIENT)
Dept: FAMILY MEDICINE CLINIC | Facility: HOSPITAL | Age: 36
End: 2021-05-24

## 2021-05-24 DIAGNOSIS — Z23 ENCOUNTER FOR IMMUNIZATION: Primary | ICD-10-CM

## 2021-05-24 PROCEDURE — 91300 SARS-COV-2 / COVID-19 MRNA VACCINE (PFIZER-BIONTECH) 30 MCG: CPT

## 2021-05-24 PROCEDURE — 0002A SARS-COV-2 / COVID-19 MRNA VACCINE (PFIZER-BIONTECH) 30 MCG: CPT

## 2021-07-15 ENCOUNTER — TELEPHONE (OUTPATIENT)
Dept: UROLOGY | Facility: MEDICAL CENTER | Age: 36
End: 2021-07-15

## 2021-07-15 DIAGNOSIS — Z87.442 HISTORY OF RENAL CALCULI: Primary | ICD-10-CM

## 2021-07-15 NOTE — TELEPHONE ENCOUNTER
Patient managed by Johanna Nesbitt  Patient believes he has a stone  Patient states he is having pain on right side  Patient is having spasms and pain  Patient states pain level 8  Patient is going on a trip in a couple of days and is concerned  Patient denies and fever or chills or burning when urinating at this time  Please advise

## 2021-07-15 NOTE — TELEPHONE ENCOUNTER
Returning a call to Patient   Seen in ourNadya [facility]azos office   Diag:Nephrolithiasis  Last office visit: 2/21/21 stent removal  Next appt:F/U 8-12 weeks with AP  KUB prior  Symptoms as verbalized by patient  States it feels exactly the way  It did when he had a stone in February of this year  Pain: abdomen /intermittent/ radiating down to testicle on right side  8/10   Nausea/Vomiting:none  Fever/Chills: none  Bowel Movements:  Urine color: yellow / non pain with urination  Frequency/Urgencey: none  Stream: slightly decreased     Advised patient  Encouraged hydration 40-60oz of water daily  Use OTC medications for pain  Avoiding Bladder irritants such as coffee,tea,soda,carbonated beverages  Limiting your alcohol intake  Avoiding constipation  Reducing cigarette smoking  Requested patient monitor/contact our office with fever above 101 0, chills, decreased urination or unable to urinate, blood or clots in the Urine  Health Call after hours protocol reviewed  Ed precautions reviewed   Patient verbilized understanding  Advised patient to get testing as requested per provider plan in February    Will send encounter to provider for further recomendation

## 2021-07-15 NOTE — TELEPHONE ENCOUNTER
I spoke to the patient over the phone  He believes he has the small stone on the right side I sent him some pain medication and Flomax and hopefully he will pass the stone before he leaves on vacation

## 2021-11-04 ENCOUNTER — OFFICE VISIT (OUTPATIENT)
Dept: UROLOGY | Facility: CLINIC | Age: 36
End: 2021-11-04
Payer: COMMERCIAL

## 2021-11-04 VITALS
SYSTOLIC BLOOD PRESSURE: 120 MMHG | WEIGHT: 184 LBS | DIASTOLIC BLOOD PRESSURE: 80 MMHG | HEIGHT: 67 IN | HEART RATE: 68 BPM | BODY MASS INDEX: 28.88 KG/M2

## 2021-11-04 DIAGNOSIS — Z30.2 ENCOUNTER FOR STERILIZATION: Primary | ICD-10-CM

## 2021-11-04 PROCEDURE — 99204 OFFICE O/P NEW MOD 45 MIN: CPT | Performed by: PHYSICIAN ASSISTANT

## 2021-12-21 ENCOUNTER — IMMUNIZATIONS (OUTPATIENT)
Dept: FAMILY MEDICINE CLINIC | Facility: HOSPITAL | Age: 36
End: 2021-12-21

## 2021-12-21 DIAGNOSIS — Z23 ENCOUNTER FOR IMMUNIZATION: Primary | ICD-10-CM

## 2021-12-21 PROCEDURE — 91300 COVID-19 PFIZER VACC 0.3 ML: CPT

## 2021-12-21 PROCEDURE — 0001A COVID-19 PFIZER VACC 0.3 ML: CPT

## 2022-01-20 ENCOUNTER — TELEPHONE (OUTPATIENT)
Dept: UROLOGY | Facility: MEDICAL CENTER | Age: 37
End: 2022-01-20

## 2022-01-20 DIAGNOSIS — Z30.2 ENCOUNTER FOR STERILIZATION: Primary | ICD-10-CM

## 2022-01-20 RX ORDER — ALPRAZOLAM 2 MG/1
TABLET ORAL
Qty: 1 TABLET | Refills: 0 | Status: SHIPPED | OUTPATIENT
Start: 2022-01-20 | End: 2022-01-26

## 2022-01-20 NOTE — TELEPHONE ENCOUNTER
Called and spoke to patient  Patient stated he is pretty sure he has a friend to bring him to and from procedure, if that falls through he will call and re schedule as he cannot take an Beltinci home if he takes the Xanax  Sending to Mariella Ritchie to re send the Xanax as it is no longer at the pharmacy as patient did not pick it up when he had his consult in November

## 2022-01-20 NOTE — TELEPHONE ENCOUNTER
Patient seen by Kirti Beebe at [address] [place] [address]    Patient called stating he will be having a vasectomy on 01/26/22 with Dr Shai Cobb and he wants to know if he can have an Shelia Chance pick him up  He does not have transportation that day  He also needs the instructions for his prep before and after the procedure to be sent to him via my chart  He cannot find it  He stated he went to the pharmacy to  prescription for after the procedure and no medication was sent   Please review and send to [facility] on chart  Call him to confirm information

## 2022-01-26 ENCOUNTER — PROCEDURE VISIT (OUTPATIENT)
Dept: UROLOGY | Facility: CLINIC | Age: 37
End: 2022-01-26
Payer: COMMERCIAL

## 2022-01-26 VITALS
DIASTOLIC BLOOD PRESSURE: 82 MMHG | BODY MASS INDEX: 30.76 KG/M2 | HEIGHT: 67 IN | SYSTOLIC BLOOD PRESSURE: 130 MMHG | WEIGHT: 196 LBS

## 2022-01-26 DIAGNOSIS — Z30.2 ENCOUNTER FOR STERILIZATION: Primary | ICD-10-CM

## 2022-01-26 PROCEDURE — 55250 REMOVAL OF SPERM DUCT(S): CPT | Performed by: UROLOGY

## 2022-01-26 PROCEDURE — 88302 TISSUE EXAM BY PATHOLOGIST: CPT | Performed by: PATHOLOGY

## 2022-01-26 RX ORDER — HYDROCODONE BITARTRATE AND ACETAMINOPHEN 5; 325 MG/1; MG/1
1 TABLET ORAL EVERY 6 HOURS PRN
Qty: 5 TABLET | Refills: 0 | Status: SHIPPED | OUTPATIENT
Start: 2022-01-26

## 2022-01-26 NOTE — PROGRESS NOTES
Vasectomy     Date/Time 1/26/2022 10:09 AM     Performed by  Jr Finn MD     Authorized by Jr Finn MD      [facility] Protocol   Timeout called at: 1/26/2022 10:09 AM   Patient identity confirmed: verbally with patient        Local anesthesia used: yes      Anesthesia: local infiltration and nerve block     Anesthesia   Local anesthesia used: yes  Local Anesthetic: lidocaine 1% without epinephrine     Sedation   Patient sedated: no        Specimen: yes    Culture: no   Procedure Details   Procedure Notes:   Vasectomy Procedure Note:    Risks and benefits of vasectomy were previously discussed  Informed consent was obtained at his prior office visit  The patient has taken Ativan as prescribed  The patient was placed in the supine position  His genitalia was prepped with a Betadine solution and draped in a sterile fashion  The left vas deferens was grasped and presented to the superficial left scrotum  2% lidocaine was used to anesthetize the skin, subcutaneous tissue, and left vas deferens  A scalpeless opening was made in the scrotal skin  The left vas deferens was grasped and presented into the surgical field  A #15 blade was used to make a longitudinal incision in the sheath of the vas deferens  The vas itself was then dissected free from the surrounding tissue  Clamps were placed proximally and distally and a 1 cm segment of the vas deferens was excised  Silk ties were applied and exposed ends were fulgurated  Hemostasis was excellent  The vas was returned to its natural anatomic position  The same procedure was then repeated on the patient's right side  Chromic suture was placed through the skin and dartos to reapproximate both defects  Betadine was removed and Bacitracin ointment was applied  The patient tolerated the procedure well          Patient Transportation: confirmed  Patient tolerance: patient tolerated the procedure well with no immediate complications

## 2022-01-26 NOTE — PROGRESS NOTES
UROLOGY PROCEDURE NOTE     CHIEF COMPLAINT   Alyssa Valdes is a 39 y o  male with a complaint of   Chief Complaint   Patient presents with    Vasectomy       History of Present Illness:     39 y o  male, known to Dr Monica Preston after stone treatment in 2020  Patient returned in follow-up with desire for elective sterilization  Patient has 2 children at home with no prior history of surgery on the groin or scrotum  Does have a history of some mild scrotal discomfort and ultrasound demonstrated small left epididymal cyst and small left varicocele  He does have some heavy lifting as he exercises regularly  Past Medical History:     Past Medical History:   Diagnosis Date    Anesthesia complication     " Slow to wake up after anesthesia" Was admitted after foot surgery due to sleepiness    Kidney stone     Sterilization consult        PAST SURGICAL HISTORY:     Past Surgical History:   Procedure Laterality Date    EXCISION / CURETTAGE BONE CYST TALUS / CALCANEUS Left     FL RETROGRADE PYELOGRAM  2/17/2020    LA CYSTO/URETERO W/LITHOTRIPSY &INDWELL STENT INSRT Left 2/17/2020    Procedure: CYSTOSCOPY URETEROSCOPY WITH LITHOTRIPSY HOLMIUM LASER, RETROGRADE PYELOGRAM AND INSERTION STENT URETERALAND STONE BASKET;  Surgeon: Monica Preston MD;  [facility]: [facility];  Service: Urology    WISDOM TOOTH EXTRACTION         CURRENT MEDICATIONS:     Current Outpatient Medications   Medication Sig Dispense Refill    HYDROcodone-acetaminophen (NORCO) 5-325 mg per tablet Take 1 tablet by mouth every 6 (six) hours as needed for pain for up to 5 doses Max Daily Amount: 4 tablets 5 tablet 0     No current facility-administered medications for this visit         ALLERGIES:   No Known Allergies    SOCIAL HISTORY:     Social History     Socioeconomic History    Marital status: /Civil Union     Spouse name: None    Number of children: None    Years of education: None    Highest education level: None Occupational History    None   Tobacco Use    Smoking status: Never Smoker    Smokeless tobacco: Never Used   Vaping Use    Vaping Use: Never used   Substance and Sexual Activity    Alcohol use: Yes     Comment: Socially    Drug use: Never    Sexual activity: None   Other Topics Concern    None   Social History Narrative    None     Social Determinants of Health     Financial Resource Strain: Not on file   Food Insecurity: Not on file   Transportation Needs: Not on file   Physical Activity: Not on file   Stress: Not on file   Social Connections: Not on file   Intimate Partner Violence: Not on file   Housing Stability: Not on file       SOCIAL HISTORY:     Family History   Problem Relation Age of Onset    Nephrolithiasis Father     Nephrolithiasis Mother        REVIEW OF SYSTEMS:     Review of Systems   Constitutional: Negative  Respiratory: Negative  Cardiovascular: Negative  Gastrointestinal: Negative  Genitourinary: Negative  Musculoskeletal: Negative  Skin: Negative  Psychiatric/Behavioral: Negative  PHYSICAL EXAM:     /82 (BP Location: Left arm, Patient Position: Sitting, Cuff Size: Adult)   Ht 5' 7" (1 702 m)   Wt 88 9 kg (196 lb)   BMI 30 70 kg/m²     General:  Healthy appearing male in no acute distress  They have a normal affect  There is not appear to be any gross neurologic defects or abnormalities  HEENT:  Normocephalic, atraumatic  Neck is supple without any palpable lymphadenopathy  Cardiovascular:  Patient has normal palpable distal radial pulses  There is no significant peripheral edema  No JVD is noted  Respiratory:  Patient has unlabored respirations  There is no audible wheeze or rhonchi  Abdomen: Abdomen is soft and nontender  There is no tympany  Inguinal and umbilical hernia are not appreciated      Genitourinary: no penile lesions or discharge, no testicular masses or tenderness, no hernias    Musculoskeletal:  Patient does not have significant CVA tenderness in the flank with palpation or percussion  They full range of motion in all 4 extremities  Strength in all 4 extremities appears congruent  Patient is able to ambulate without assistance or difficulty  Dermatologic:  Patient has no skin abnormalities or rashes  LABS:     CBC: No results found for: WBC, HGB, HCT, MCV, PLT    BMP: No results found for: GLUCOSE, CALCIUM, NA, K, CO2, CL, BUN, CREATININE    IMAGIN/29/20  RENAL ULTRASOUND     INDICATION:   N20 1: Calculus of ureter  Left flank pain      COMPARISON: Previous radiographic and sonographic examination from 2020  Prior CT performed 2019  Concurrently performed radiograph      TECHNIQUE:   Ultrasound of the retroperitoneum was performed with a curvilinear transducer utilizing volumetric sweeps and still imaging techniques       FINDINGS:     KIDNEYS:  Symmetric and normal size  Right kidney:  11 0 cm  Left kidney:  11 9 cm      Right kidney  Normal echogenicity and contour  No suspicious masses detected  No hydronephrosis  No shadowing calculi  No perinephric fluid collections      Left kidney  Normal echogenicity and contour  No suspicious masses detected  Mild left-sided hydronephrosis is noted  Mild prominence of left renal collecting system  No sonographically detectable echogenic left renal calculi  No perinephric fluid collections      URETERS:  Nonvisualized      BLADDER:   Normally distended  No focal thickening or mass lesions  Bilateral ureteral jets detected         IMPRESSION:     Mild left hydronephrosis presumably on the basis of a proximal left ureteral calculus, proximally 5 mm, well visualized on concurrently performed radiograph, but also visible on prior CT and radiographic examinations of 2019 and 2020      Nonetheless, bilateral ureteral jets are noted      19  SCROTAL ULTRASOUND     INDICATION:    Left Testicular Pain, Hematuria, Injury Yesterday      COMPARISON: None     TECHNIQUE:   Ultrasound the scrotal contents was performed with a high frequency linear transducer utilizing volumetric sweep imaging as well as standard still image techniques  Imaging performed in longitudinal and transverse orientation  Color and   spectral Doppler evaluation also performed bilaterally      FINDINGS:     TESTES:   Testes are symmetric and normal in size      RIGHT testis = 4 1 x 2 3 x 2 5 cm   Normal contour with homogeneous smooth echotexture  3 x 2 x 2 mm echogenic nonshadowing focus identified in the right testicle without Doppler flow abnormality      LEFT testis = 3 7 x 2 0 x 2 6 cm  Normal contour with homogeneous smooth echotexture  No intratesticular mass lesion or calcifications      Doppler flow within both testes is present and symmetric      EPIDIDYMIDES:   Normal Size  Doppler ultrasound demonstrates normal blood flow  4 mm left epididymal cyst      HYDROCELE:  No significant fluid present      VARICOCELE:  Left-sided varicocele noted      SCROTUM:  Scrotal thickness and appearance within normal limits  No evidence for extratesticular mass or hernia demonstrated      IMPRESSION:     Solitary echogenic focus in the right testicle likely calcification measuring 3 mm  This may be the sequela of remote prior infection or trauma  Otherwise unremarkable scrotal ultrasound  STONE ANALYSIS:      2/17/20 12:11 PM    COLOR  White    Size mm 2x3    Comment: Multiple pieces received   Dimensions of the largest piece   reported  Stone Weight mg 9 2    Composition  Comment    Comment: Percentage (Represents the % composition)   Ca Oxalate,Dihydrate % 80    Ca Oxalate,Monohydr  % 20    STONE COMMENT  Comment          PROCEDURE:     SEE NOTE    ASSESSMENT:     39 y o  male with desire for elective sterilization    PLAN:     The patient is status post vasectomy  I recommended rest, ice, and scrotal support   I've asked that he return in the next 2-3 weeks for a  post vasectomy check  I have prescribed Norco to take as needed  The patient understands that he is not yet considered sterile  I have recommended semen analyses at 6 and 8 weeks post procedure  In the interim I have recommended contraception to avoid an undesired pregnancy

## 2022-01-26 NOTE — PATIENT INSTRUCTIONS
Vasectomy   WHAT YOU NEED TO KNOW:   A vasectomy is a procedure to make you sterile  It is a permanent form of birth control  The vas deferens (sperm tubes) are cut so that the semen does not contain sperm  DISCHARGE INSTRUCTIONS:   Medicines: You may need any of the following:  · NSAIDs  help decrease swelling, pain, and fever  This medicine can be bought with or without a doctor's order  This medicine can cause stomach bleeding or kidney problems in certain people  If you take blood thinner medicine, always ask your healthcare provider if NSAIDs are safe for you  Always read the medicine label and follow the directions on it before using this medicine  · Take your medicine as directed  Contact your healthcare provider if you think your medicine is not helping or if you have side effects  Tell him if you are allergic to any medicine  Keep a list of the medicines, vitamins, and herbs you take  Include the amounts, and when and why you take them  Bring the list or the pill bottles to follow-up visits  Carry your medicine list with you in case of an emergency  Decrease pain and swelling:   · Lie on your back  as much as possible the day of your procedure  Place a cushion such as a washcloth or small towel under your scrotum to elevate it  · Apply ice  on your scrotum for 15 to 20 minutes every hour for 2 days  Use an ice pack, or put crushed ice in a plastic bag  Cover it with a towel  Ice helps prevent tissue damage and decreases swelling and pain  · Wear an athletic supporter for at least 2 days  This will decrease pain and swelling, and protect your wound  Wound care:  Care for your wound as directed  Carefully wash the wound with soap and water  Dry the area and put on new, clean bandages as directed  Change your bandages when they get wet or dirty  Activity:  You may walk and drive normally the day after your procedure   Do not play sports, do yard work, or lift anything heavy until your healthcare provider says it is okay  You may need to wait up to a week before you have sex  Follow up with your healthcare provider or surgeon in 12 weeks: You will need to return to have your semen tested for sperm  Write down your questions so you remember to ask them during your visits  Contact your healthcare provider or surgeon if:   · You have a fever  · Your wound is red, swollen, or draining pus  · You feel pain or burning when you urinate  · You have worsening pain in your scrotum, even after you take medicine  · You have questions or concerns about your condition or care  Seek care immediately or call 911 if:   · Blood soaks through your bandage  · Your stitches come apart  · You see blood in your urine or semen  © 2017 [employer] Information is for End User's use only and may not be sold, redistributed or otherwise used for commercial purposes  All illustrations and images included in CareNotes® are the copyrighted property of [employer]  or [employer]  The above information is an  only  It is not intended as medical advice for individual conditions or treatments  Talk to your doctor, nurse or pharmacist before following any medical regimen to see if it is safe and effective for you

## 2022-01-31 ENCOUNTER — TELEPHONE (OUTPATIENT)
Dept: OTHER | Facility: OTHER | Age: 37
End: 2022-01-31

## 2022-01-31 NOTE — TELEPHONE ENCOUNTER
Call placed to patient and spoke with him  Informed him of the recommendations of the CRNP at this time  Pt is aware

## 2022-01-31 NOTE — TELEPHONE ENCOUNTER
Prescription refill for hydrocodone declined    Patient will have better coverage of discomfort with use anti-inflammatory agents such as ibuprofen 600 mg every 6 hours, scrotal elevation and immobilization, ice/heat as needed for discomfort

## 2022-02-08 ENCOUNTER — OFFICE VISIT (OUTPATIENT)
Dept: UROLOGY | Facility: CLINIC | Age: 37
End: 2022-02-08

## 2022-02-08 VITALS
HEART RATE: 76 BPM | DIASTOLIC BLOOD PRESSURE: 70 MMHG | WEIGHT: 196 LBS | HEIGHT: 67 IN | BODY MASS INDEX: 30.76 KG/M2 | SYSTOLIC BLOOD PRESSURE: 120 MMHG

## 2022-02-08 DIAGNOSIS — Z98.52 STATUS POST VASECTOMY: Primary | ICD-10-CM

## 2022-02-08 PROCEDURE — 99024 POSTOP FOLLOW-UP VISIT: CPT | Performed by: NURSE PRACTITIONER

## 2022-02-08 NOTE — PROGRESS NOTES
2/8/2022  Fior Zapien is a 39 y o  male    Assessment/Plan    1  Status post vasectomy performed 01/26/2022  -Pathology was reviewed and reveals cross sections of bilateral vas deferens  -Advised to attempt to ejaculate 15 times prior to his semen analysis  -use contraception until sterility confirmed with 1 semen analyse at 8-12 weeks  -he will call for results of the semen analysis  -take ibuprofen or naproxyn bid x 7 days, call if symptoms fail to improve    Discussion  Zack Vo is a 39 y o  male being managed by Dr Cummins Hearing  The patient is doing well with no complaints  He was provided verbal and written instructions regarding semen analysis testing  He was instructed to use contraception until sterility confirmed with 1 semen analysis, at 8-12 weeks after the vasectomy took place  He is aware this is change in the protocol and his previously provided paperwork may states something different  He was advised to attempt to ejaculate 15 times prior to his semen analysis and not to ejaculate within 48 hours prior to collection  We will call to review results  He will follow up on an as needed basis  All questions were answered  Pathology was reviewed and reveals cross sections of bilateral vas deferens  Denies any family history of prostate cancer- advised to start prostate cancer screening PSA/LAEJANDRO at age 48  History of Present Illness  39 y o  male s/p vasectomy (01/26/2022), presents today for follow up  He denies any scrotal pain or swelling  He still has some bruising  He noted increased pain initially because he worked out  He has some right testicular firmness and discomfort  Gloria Powell also examined him today  Recommended anti-inflammatories bid x 1 week  He will call if symptoms do not improve  He is doing well with no voiding issues after surgery      Vitals  Vitals:    02/08/22 1052   BP: 120/70   Pulse: 76   Weight: 88 9 kg (196 lb)   Height: 5' 7" (1 702 m)       Current Medications  Current Outpatient Medications   Medication Sig Dispense Refill    HYDROcodone-acetaminophen (NORCO) 5-325 mg per tablet Take 1 tablet by mouth every 6 (six) hours as needed for pain for up to 5 doses Max Daily Amount: 4 tablets (Patient not taking: Reported on 2/8/2022 ) 5 tablet 0     No current facility-administered medications for this visit  Surgical Pathology Report                         Case: [id number]                                    Authorizing Provider: Kait Martini MD   Collected:           01/26/2022 1412               Ordering Location:     [facility]        Received:            01/26/2022 1412                                      Urology [place]                                                              Pathologist:           Magda Kern MD                                                                   Specimens:   A) - Vas Deferens, Right                                                                             B) - Vas Deferens, Left                                                                    Final Diagnosis   A  Right vas deferens, vasectomy:  - Complete cross section of vas deferens      B  Left vas deferens, vasectomy:  - Complete cross section of vas deferens  Review of Systems  Patient denies any gross hematuria, dysuria, or difficulty urinating      Physical Exam  Gu: scrotum midline/bilateral incisions c/d/i

## 2022-05-04 ENCOUNTER — APPOINTMENT (OUTPATIENT)
Dept: LAB | Facility: HOSPITAL | Age: 37
End: 2022-05-04
Payer: COMMERCIAL

## 2022-05-04 DIAGNOSIS — Z98.52 STATUS POST VASECTOMY: ICD-10-CM

## 2022-05-04 LAB
DEPRECATED CD4 CELLS/CD8 CELLS BLD: 0.8 ML
SPERM MOTILE SMN QL MICRO: NORMAL

## 2022-05-04 PROCEDURE — 89321 SEMEN ANAL SPERM DETECTION: CPT

## 2022-08-08 NOTE — TELEPHONE ENCOUNTER
Patient seen in ED today for gross hematuria,left testicular pain, and kidney stone  CT IMPRESSION:     1  Mild left-sided hydronephrosis, secondary to a 4 mm stone in the region of the left UPJ   2  Slight distention of the more distal left ureter with periureteral edema but without evidence of a distal ureteral stone  Appearance suggests recent passage of a calculus  Intraprostatic calcification noted, likely glandular or less likely within   the prostatic urethra  3  Additional small punctate bilateral intrarenal calculi  4  Hepatic steatosis    US IMPRESSION:     Solitary echogenic focus in the right testicle likely calcification measuring 3 mm  This may be the sequela of remote prior infection or trauma  Otherwise unremarkable scrotal ultrasound  First available appointment in [facility] office is 12/10 at 1:30 in a "Next Day" spot with Deonte Art  Is this appropriate? Please Advise  [employer]: 8839 [employer]: 8150

## 2025-03-02 ENCOUNTER — OFFICE VISIT (OUTPATIENT)
Dept: URGENT CARE | Age: 40
End: 2025-03-02
Payer: COMMERCIAL

## 2025-03-02 VITALS
HEART RATE: 96 BPM | OXYGEN SATURATION: 99 % | BODY MASS INDEX: 29.76 KG/M2 | SYSTOLIC BLOOD PRESSURE: 132 MMHG | DIASTOLIC BLOOD PRESSURE: 82 MMHG | TEMPERATURE: 98.7 F | RESPIRATION RATE: 18 BRPM | WEIGHT: 190 LBS

## 2025-03-02 DIAGNOSIS — R05.1 ACUTE COUGH: ICD-10-CM

## 2025-03-02 DIAGNOSIS — B34.9 VIRAL INFECTION: Primary | ICD-10-CM

## 2025-03-02 PROCEDURE — G0382 LEV 3 HOSP TYPE B ED VISIT: HCPCS

## 2025-03-02 RX ORDER — ALBUTEROL SULFATE 90 UG/1
2 INHALANT RESPIRATORY (INHALATION) EVERY 6 HOURS PRN
Qty: 8.5 G | Refills: 0 | Status: SHIPPED | OUTPATIENT
Start: 2025-03-02

## 2025-03-02 RX ORDER — PREDNISONE 10 MG/1
TABLET ORAL
Qty: 21 TABLET | Refills: 0 | Status: SHIPPED | OUTPATIENT
Start: 2025-03-02

## 2025-03-02 NOTE — PATIENT INSTRUCTIONS
Albuterol as needed for shortness of breath and wheezing.    May start Prednisone taper. The best time to take a steroid is in the morning with breakfast. Do not take additional Motrin/Ibuprofen/Advil while on the Prednisone.  You may take additional Tylenol as needed for pain/fever.     If symptoms continue past the 3 days, you may fill the paper RX for Zithromax.     Nearly all of upper respiratory infections in adults are the result of viruses. These viruses include COVID, flu, RSV, adenoviruses and other coronaviruses. Antibiotics do not treat viruses and are not recommended or indicated at this time.   Take over the counter medications as needed.   Recommend over the counter decongestants as needed and age appropriate.   Neti Pot rinses and saline nasal sprays may also help clear nasal and/or sinus congestion. Frequent suctioning (before/after naps and nighttime sleep) can help symptoms in infants and young children  Recommend Mucinex to assist in the break-up of chest congestion in adults. Recommend Zarbee's cold over the counter treatments for young children (under the age of 2).   Also may consider over the counter allergy medications such as Allegra-D and Zyrtec.   If symptoms persist for 7+ days, follow-up with primary care provider or return to clinic to discuss appropriateness of antibiotics.   Vaccination is important to help prevent the spread of disease and infants. Vaccines are available for COVID and influenza for ages 6 months and older. Please discuss scheduling vaccines with your PCP.    If symptoms worsen, shortness of breath develops, you experience severe sinus pain, difficulty swallowing or changes in voice, report to the emergency department.    RSV: When It's More Than Just a Cold - [employer]

## 2025-03-02 NOTE — PROGRESS NOTES
[facility]        NAME: Mahesh Morales is a 40 y.o. male  : 1985    MRN: [id number]  DATE: 2025  TIME: 1:53 PM    Assessment and Plan   Viral infection [B34.9]  1. Viral infection  albuterol (ProAir HFA) 90 mcg/act inhaler    predniSONE 10 mg tablet      2. Acute cough  albuterol (ProAir HFA) 90 mcg/act inhaler    predniSONE 10 mg tablet        Patient is afebrile and exam was benign except for some mild bilateral erythematous and edematous nasal turbinates. No sinus tenderness, tonsillar exudate or cervical lymphadenopathy noted. Loose cough, no fever. No abx therapy indicated at this time. Counseled patient on supportive therapy with plenty of fluids, tea with honey, throat lozenges. Can use saline nasal irrigation.  Prescribed prednisone and may use albuterol as needed. Tylenol or Motrin as needed for pain.May use decongestants as directed.  PT requesting abx.  Discussed with pt symptoms are most likely viral in nature. Paper rx ZPak provided to pt dated 3/5, may fill rx if no improvement.  Recommend following up PCP.       Patient Instructions       Follow up with PCP in 3-5 days.  Proceed to  ER if symptoms worsen.    If tests have been performed at [facility], our office will contact you with results if changes need to be made to the care plan discussed with you at the visit.  You can review your full results on [facility].    Chief Complaint     Chief Complaint   Patient presents with   • Cough     Pt reports productive cough for past three days with yellow/green sputum along with headache.          History of Present Illness       Pt is a 40 year old male presenting with 2 day of loose cough, wheezing at night time, congestion, and headache. He denies fever, chills, CP, SOB, difficulty breathing,  He denies taking anything for his symptoms.  He is eating and drinking well, normal urine output.         Review of Systems   Review of Systems   Constitutional:  Positive for  "fatigue. Negative for chills and fever.   HENT:  Positive for congestion. Negative for rhinorrhea, sinus pressure and sore throat.    Respiratory:  Positive for cough and wheezing. Negative for chest tightness and shortness of breath.    Cardiovascular:  Negative for chest pain.         Current Medications       Current Outpatient Medications:   •  albuterol (ProAir HFA) 90 mcg/act inhaler, Inhale 2 puffs every 6 (six) hours as needed for shortness of breath or wheezing, Disp: 8.5 g, Rfl: 0  •  predniSONE 10 mg tablet, 6 Tabs day 1, 5 tabs day 2, 4 tabs day 3, 3 tabs day 4, 2 tabs day 5, 1 tab day 6, Disp: 21 tablet, Rfl: 0  •  HYDROcodone-acetaminophen (NORCO) 5-325 mg per tablet, Take 1 tablet by mouth every 6 (six) hours as needed for pain for up to 5 doses Max Daily Amount: 4 tablets (Patient not taking: Reported on 2/8/2022 ), Disp: 5 tablet, Rfl: 0    Current Allergies     Allergies as of 03/02/2025   • (No Known Allergies)            The following portions of the patient's history were reviewed and updated as appropriate: allergies, current medications, past family history, past medical history, past social history, past surgical history and problem list.     Past Medical History:   Diagnosis Date   • Anesthesia complication     \" Slow to wake up after anesthesia\" Was admitted after foot surgery due to sleepiness   • Kidney stone    • Sterilization consult        Past Surgical History:   Procedure Laterality Date   • EXCISION / CURETTAGE BONE CYST TALUS / CALCANEUS Left    • FL RETROGRADE PYELOGRAM  2/17/2020   • MA CYSTO/URETERO W/LITHOTRIPSY &INDWELL STENT INSRT Left 2/17/2020    Procedure: CYSTOSCOPY URETEROSCOPY WITH LITHOTRIPSY HOLMIUM LASER, RETROGRADE PYELOGRAM AND INSERTION STENT URETERALAND STONE BASKET;  Surgeon: Trav Alonso MD;  Location[facility];  Service: Urology   • WISDOM TOOTH EXTRACTION         Family History   Problem Relation Age of Onset   • Nephrolithiasis Father    • " Nephrolithiasis Mother          Medications have been verified.        Objective   /82   Pulse 96   Temp 98.7 °F (37.1 °C)   Resp 18   Wt 86.2 kg (190 lb)   SpO2 99%   BMI 29.76 kg/m²   No LMP for male patient.       Physical Exam     Physical Exam  Vitals and nursing note reviewed.   Constitutional:       General: He is not in acute distress.     Appearance: Normal appearance. He is normal weight. He is not ill-appearing.   HENT:      Right Ear: Tympanic membrane normal.      Left Ear: Tympanic membrane normal.      Nose: Congestion and rhinorrhea present.      Mouth/Throat:      Pharynx: No posterior oropharyngeal erythema.   Eyes:      Extraocular Movements: Extraocular movements intact.   Cardiovascular:      Rate and Rhythm: Normal rate and regular rhythm.      Pulses: Normal pulses.   Pulmonary:      Effort: Pulmonary effort is normal. No respiratory distress.      Breath sounds: Normal breath sounds. No stridor. No wheezing, rhonchi or rales.   Chest:      Chest wall: No tenderness.   Skin:     Capillary Refill: Capillary refill takes less than 2 seconds.   Neurological:      Mental Status: He is alert.

## (undated) DEVICE — GLOVE SRG BIOGEL 7.5

## (undated) DEVICE — SPECIMEN CONTAINER STERILE PEEL PACK

## (undated) DEVICE — PREMIUM DRY TRAY LF: Brand: MEDLINE INDUSTRIES, INC.

## (undated) DEVICE — PACK TUR

## (undated) DEVICE — INVIEW CLEAR LEGGINGS: Brand: CONVERTORS

## (undated) DEVICE — 200 MICRON SINGLE-USE HOLMIUM FIBER ASSEMBLY WITH FLAT TIP: Brand: OPTILITE

## (undated) DEVICE — STERILE SURGICAL LUBRICANT,  TUBE: Brand: SURGILUBE

## (undated) DEVICE — GUIDEWIRE STRGHT TIP 0.035 IN  SOLO PLUS

## (undated) DEVICE — TUBING BUBBLE CLEAR 5MM X 100 FT NS

## (undated) DEVICE — CHLORHEXIDINE 4PCT 4 OZ

## (undated) DEVICE — BASKET SPECIMEN RETRIVAL 1.9FR 120CM

## (undated) DEVICE — UROCATCH BAG